# Patient Record
Sex: MALE | Race: WHITE | NOT HISPANIC OR LATINO | Employment: UNEMPLOYED | URBAN - METROPOLITAN AREA
[De-identification: names, ages, dates, MRNs, and addresses within clinical notes are randomized per-mention and may not be internally consistent; named-entity substitution may affect disease eponyms.]

---

## 2017-01-30 ENCOUNTER — OFFICE VISIT (OUTPATIENT)
Dept: CARDIOLOGY | Facility: CLINIC | Age: 57
End: 2017-01-30

## 2017-01-30 VITALS
BODY MASS INDEX: 19.43 KG/M2 | WEIGHT: 159.6 LBS | DIASTOLIC BLOOD PRESSURE: 78 MMHG | HEIGHT: 76 IN | HEART RATE: 64 BPM | SYSTOLIC BLOOD PRESSURE: 122 MMHG

## 2017-01-30 DIAGNOSIS — E78.00 PURE HYPERCHOLESTEROLEMIA: ICD-10-CM

## 2017-01-30 DIAGNOSIS — I63.9 BRAINSTEM STROKE (HCC): ICD-10-CM

## 2017-01-30 DIAGNOSIS — I10 ESSENTIAL HYPERTENSION: ICD-10-CM

## 2017-01-30 DIAGNOSIS — I25.10 CORONARY ARTERY DISEASE INVOLVING NATIVE CORONARY ARTERY OF NATIVE HEART WITHOUT ANGINA PECTORIS: ICD-10-CM

## 2017-01-30 DIAGNOSIS — R07.89 OTHER CHEST PAIN: Primary | ICD-10-CM

## 2017-01-30 DIAGNOSIS — Z72.0 TOBACCO ABUSE: ICD-10-CM

## 2017-01-30 PROCEDURE — 99204 OFFICE O/P NEW MOD 45 MIN: CPT | Performed by: INTERNAL MEDICINE

## 2017-01-30 PROCEDURE — 93000 ELECTROCARDIOGRAM COMPLETE: CPT | Performed by: INTERNAL MEDICINE

## 2017-01-30 RX ORDER — METOPROLOL SUCCINATE 25 MG/1
25 TABLET, EXTENDED RELEASE ORAL DAILY
COMMUNITY
End: 2018-04-09

## 2017-01-30 RX ORDER — ASPIRIN 81 MG/1
81 TABLET ORAL DAILY
COMMUNITY

## 2017-01-30 NOTE — PROGRESS NOTES
Subjective:     Encounter Date:01/30/2017      Patient ID: Kamron Johns is a 56 y.o. male.    Chief Complaint:Atrial Fibrillation (Consult); Chest Pain; Shortness of Breath; Pain (Rt lower arm all the way up to his Rt scapula); and Irregular Heart Beat    Past Medical History:  1. Coronary artery disease  1. Catheter 2012, [place], mild nonflow limiting disease        2.   Reported history of myocardial infarction by stress test.  2. Hypertension  3. Dyslipidemia  4. Reported history of atrial fibrillation  5. Ongoing tobacco abuse  6. Bell's palsy 2016  A. 2016 negative echo  B. 2016 mild bilateral carotid disease  C. MRI 2016 no acute intracranial abnormality.  7. Diverticulosis with history of diverticulitis  8. History nephrolithiasis  9. Cervical spine disease  10. Chronic idiopathic constipation  11. Anxiety   12. Depression      Past Surgical History:  1. Umbilical hernia repair  2. Finger surgery  3. Nasal septum repair      Allergies   Allergen Reactions   • Pseudoephedrine      Very hyper         Current Outpatient Prescriptions:   •  aspirin 81 MG EC tablet, Take 81 mg by mouth Daily., Disp: , Rfl:   •  atorvastatin (LIPITOR) 80 MG tablet, Take 1 tablet by mouth Every Night. (Patient taking differently: Take 10 mg by mouth 2 (Two) Times a Day.), Disp: 30 tablet, Rfl: 11  •  carisoprodol (SOMA) 250 MG tablet, Take 350 mg by mouth 3 (Three) Times a Day As Needed for muscle spasms., Disp: , Rfl:   •  clonazePAM (KlonoPIN) 1 MG tablet, Take 1 mg by mouth 3 (Three) Times a Day As Needed for seizures., Disp: , Rfl:   •  HYDROcodone-acetaminophen (LORTAB)  MG per tablet, Take 1 tablet by mouth 4 (Four) Times a Day As Needed for moderate pain (4-6)., Disp: , Rfl:   •  metoprolol succinate XL (TOPROL-XL) 25 MG 24 hr tablet, Take 25 mg by mouth Daily., Disp: , Rfl:   •  pantoprazole (PROTONIX) 40 MG EC tablet, Take 40 mg by mouth As Needed., Disp: , Rfl:   •  polyethylene glycol (MIRALAX) packet, Take 17 g  "by mouth Daily., Disp: , Rfl:   •  polyvinyl alcohol (LIQUIFILM) 1.4 % ophthalmic solution, Administer 1 drop into the left eye As Needed for dry eyes., Disp: , Rfl:   •  sodium chloride (OCEAN) 0.65 % nasal spray, 1 spray into each nostril As Needed for congestion., Disp: , Rfl:         History of Present Illness    Patient is a 56-year-old  male who we are seeing today for further evaluation of possible coronary disease and reported history of atrial fibrillation.    Patient report 2011 he was told that he had atrial fibrillation at time the had a cardiac catheterization with reported mild coronary disease.  He is in his usual state of health up until the end of October this past year whenever he had issues with left sided facial droop, speech difficulty, and dysphagia.  He was brought to the hospital for further evaluation.  There he was diagnosed with Bell's palsy and treated.  Since that time he still notes shortness of breath.  He has recurrent right arm pain which she describes as \"crushing\" in nature.  No specific triggering factors noted.  He notes he also underwent previous cardiac evaluation this past summer and at that time was told that he had had a heart attack in the past.  Patient does not carry a previous history of heart attack but no further investigation was performed.  No cardiac catheterization.  We are attempting to obtain records.  Patient has had chronic chest pain daily for years.  He is quite anxious and nervous with the fact he was told he had a \"heart attack\" by his stress test.  I'll confirm this finding.  He is convinced he had a heart attack on June 2 when he had an approximately one week's worth of severe \"crushing chest pain\", that was unrelenting.  He also notes persistent functional class 2-3 dyspnea which she attributes to a COPD.  He has a recent diagnosis of brainstem CVA, with a normal MRI.  He does have persistent neurologic findings which are obvious.  He needs to " "smoke more than 3 packs of cigarettes daily.  Even though he has a history of remote atrial fibrillation, none was documented on recent 14 day event recorder, or in his 1 week hospital stay.  The following portions of the patient's history were reviewed and updated as appropriate: allergies, current medications, past family history, past medical history, past social history, past surgical history and problem list.    Family History   Problem Relation Age of Onset   • Dementia Mother    • Emphysema Mother    • Alcohol abuse Father    • Cancer Father    • Heart disease Father    • Heart attack Father    • Cancer Brother    • Hypertension Brother    • Breast cancer Sister        Social History   Substance Use Topics   • Smoking status: Current Every Day Smoker     Packs/day: 3.00     Types: Cigarettes   • Smokeless tobacco: Never Used   • Alcohol use No         Review of Systems   Constitution: Positive for malaise/fatigue and weight loss. Negative for fever and weakness.   HENT: Negative for headaches and nosebleeds.    Eyes: Negative for redness and visual disturbance.   Cardiovascular: Positive for chest pain and palpitations. Negative for orthopnea and paroxysmal nocturnal dyspnea.   Respiratory: Positive for shortness of breath. Negative for cough, snoring, sputum production and wheezing.    Hematologic/Lymphatic: Negative for bleeding problem.   Skin: Negative for flushing, itching and rash.   Musculoskeletal: Positive for muscle weakness and myalgias. Negative for falls, joint pain and muscle cramps.   Gastrointestinal: Negative for abdominal pain, diarrhea, heartburn, nausea and vomiting.   Genitourinary: Negative for hematuria.   Neurological: Positive for dizziness. Negative for excessive daytime sleepiness and tremors.   Psychiatric/Behavioral: Negative for substance abuse. The patient is not nervous/anxious.           Objective:    height is 76\" (193 cm) and weight is 159 lb 9.6 oz (72.4 kg). His blood " pressure is 122/78 and his pulse is 64.         Physical Exam   Constitutional: He is oriented to person, place, and time. He appears well-developed and well-nourished.   HENT:   Head: Normocephalic and atraumatic.   Mouth/Throat: Oropharynx is clear and moist.   Eyes: Conjunctivae are normal. Pupils are equal, round, and reactive to light.   Neck: Normal carotid pulses and no JVD present. Carotid bruit is not present. No thyromegaly present.   Cardiovascular: Normal rate, regular rhythm, S1 normal and S2 normal.  Exam reveals no gallop and no friction rub.    No murmur heard.  Pulses:       Carotid pulses are 2+ on the right side, and 2+ on the left side.       Dorsalis pedis pulses are 2+ on the right side, and 2+ on the left side.        Posterior tibial pulses are 2+ on the right side, and 2+ on the left side.   Pulmonary/Chest: No respiratory distress. He has no wheezes. He has no rales. He exhibits no tenderness.   Abdominal: He exhibits no distension, no abdominal bruit and no mass. There is no hepatosplenomegaly. There is no tenderness. There is no rebound.   Musculoskeletal: He exhibits no edema, tenderness or deformity.   Lymphadenopathy:     He has no cervical adenopathy.   Neurological: He is alert and oriented to person, place, and time. He has normal strength.   Significant left facial droop noted.   Skin: Skin is warm and dry. No rash noted. No cyanosis. Nails show no clubbing.   Psychiatric: He has a normal mood and affect. Cognition and memory are normal.         ECG 12 Lead  Date/Time: 1/30/2017 5:31 PM  Performed by: KATERINA LEES  Authorized by: KATERINA LEES   Rhythm: sinus rhythm  Clinical impression: normal ECG                  Assessment:   Assessment/Plan      Kamron was seen today for atrial fibrillation, chest pain, shortness of breath, pain and irregular heart beat.    Diagnoses and all orders for this visit:    Other chest pain  -     Case Request Cath Lab: Left Heart  "Cath    Coronary artery disease involving native coronary artery of native heart without angina pectoris    Brainstem stroke    Pure hypercholesterolemia    Essential hypertension    Tobacco abuse    Other orders  -     ECG 12 Lead      1.  Coronary artery disease, mild by catheter 5 years ago  2.  Chest pain, some typical and atypical features.  Abnormal myocardial perfusion study done in [place] suggesting inferior infarction (never documented).  3.  Brainstem CVA, likely small vessel, no evidence of embolism.  4.  Questionable history of A. Fib, none documented.  Clinically, his brainstem CVA is most likely due to localized vascular disease, that embolic event.  5.  Tobacco abuse ongoing, 3-31/2 packs daily.  .6  Hypertension  7.  Dyslipidemia    Discussed at length options with his wife and patient.  Given his recurrent chronic chest pain abnormal perfusion study, he has a strong desire to \"no\" if he had a previous myocardial infarction or not.  We will therefore proceed to left heart catheterization plus or minus PCI given infrequency of his symptoms.  Regarding his A. Fib, there is no real evidence of this over multiple hospital stays in/monitoring periods.  His clinical event does not seem to be embolic in nature.  Will not pursue further workup at this time.  I had a long discussion regarding medical therapy, which is adequate, but more importantly smoking cessation.  We discussed that he has a difficulty quitting cold turkey from this degree of nicotine dependence.  I have asked him to try to cut back significantly,on his usage, in attempt to \"rarash in\" his cigarettes to a more reasonable level of chemical dependence (perhaps one to one and half packs daily) for attempting to quit.further recommendations pending the heart catheter     I, Horacio Bedoya MD, personally performed the services described in this documentation as scribed by the above individual in my presence, and it is both accurate and " complete    Please note that portions of this note may have been completed with a voice recognition program. Efforts were made to edit the dictations, but occasionally words are mistranscribed.

## 2017-01-30 NOTE — LETTER
January 30, 2017     Rebeca Vallejo PA-C  [address]  [address]  [facility] [address]    Patient: Kamron Johns   YOB: 1960   Date of Visit: 1/30/2017       Dear Dr. Yoni PA-C:    Thank you for referring Kamron Johns to me for evaluation. Below are the relevant portions of my assessment and plan of care.    If you have questions, please do not hesitate to call me. I look forward to following Kamron along with you.         Sincerely,        Horacio Bedoya MD        CC: MD Horacio Estrada MD  1/30/2017  5:35 PM  Signed  Subjective:     Encounter Date:01/30/2017      Patient ID: Kamron Johns is a 56 y.o. male.    Chief Complaint:Atrial Fibrillation (Consult); Chest Pain; Shortness of Breath; Pain (Rt lower arm all the way up to his Rt scapula); and Irregular Heart Beat    Past Medical History:  1. Coronary artery disease  1. Catheter 2012, [place], mild nonflow limiting disease        2.   Reported history of myocardial infarction by stress test.  2. Hypertension  3. Dyslipidemia  4. Reported history of atrial fibrillation  5. Ongoing tobacco abuse  6. Bell's palsy 2016  A. 2016 negative echo  B. 2016 mild bilateral carotid disease  C. MRI 2016 no acute intracranial abnormality.  7. Diverticulosis with history of diverticulitis  8. History nephrolithiasis  9. Cervical spine disease  10. Chronic idiopathic constipation  11. Anxiety   12. Depression      Past Surgical History:  1. Umbilical hernia repair  2. Finger surgery  3. Nasal septum repair      Allergies   Allergen Reactions   • Pseudoephedrine      Very hyper         Current Outpatient Prescriptions:   •  aspirin 81 MG EC tablet, Take 81 mg by mouth Daily., Disp: , Rfl:   •  atorvastatin (LIPITOR) 80 MG tablet, Take 1 tablet by mouth Every Night. (Patient taking differently: Take 10 mg by mouth 2 (Two) Times a Day.), Disp: 30 tablet, Rfl: 11  •  carisoprodol (SOMA) 250 MG tablet, Take 350 mg by mouth 3 (Three)  "Times a Day As Needed for muscle spasms., Disp: , Rfl:   •  clonazePAM (KlonoPIN) 1 MG tablet, Take 1 mg by mouth 3 (Three) Times a Day As Needed for seizures., Disp: , Rfl:   •  HYDROcodone-acetaminophen (LORTAB)  MG per tablet, Take 1 tablet by mouth 4 (Four) Times a Day As Needed for moderate pain (4-6)., Disp: , Rfl:   •  metoprolol succinate XL (TOPROL-XL) 25 MG 24 hr tablet, Take 25 mg by mouth Daily., Disp: , Rfl:   •  pantoprazole (PROTONIX) 40 MG EC tablet, Take 40 mg by mouth As Needed., Disp: , Rfl:   •  polyethylene glycol (MIRALAX) packet, Take 17 g by mouth Daily., Disp: , Rfl:   •  polyvinyl alcohol (LIQUIFILM) 1.4 % ophthalmic solution, Administer 1 drop into the left eye As Needed for dry eyes., Disp: , Rfl:   •  sodium chloride (OCEAN) 0.65 % nasal spray, 1 spray into each nostril As Needed for congestion., Disp: , Rfl:         History of Present Illness    Patient is a 56-year-old  male who we are seeing today for further evaluation of possible coronary disease and reported history of atrial fibrillation.    Patient report 2011 he was told that he had atrial fibrillation at time the had a cardiac catheterization with reported mild coronary disease.  He is in his usual state of health up until the end of October this past year whenever he had issues with left sided facial droop, speech difficulty, and dysphagia.  He was brought to the hospital for further evaluation.  There he was diagnosed with Bell's palsy and treated.  Since that time he still notes shortness of breath.  He has recurrent right arm pain which she describes as \"crushing\" in nature.  No specific triggering factors noted.  He notes he also underwent previous cardiac evaluation this past summer and at that time was told that he had had a heart attack in the past.  Patient does not carry a previous history of heart attack but no further investigation was performed.  No cardiac catheterization.  We are attempting to " "obtain records.  Patient has had chronic chest pain daily for years.  He is quite anxious and nervous with the fact he was told he had a \"heart attack\" by his stress test.  I'll confirm this finding.  He is convinced he had a heart attack on June 2 when he had an approximately one week's worth of severe \"crushing chest pain\", that was unrelenting.  He also notes persistent functional class 2-3 dyspnea which she attributes to a COPD.  He has a recent diagnosis of brainstem CVA, with a normal MRI.  He does have persistent neurologic findings which are obvious.  He needs to smoke more than 3 packs of cigarettes daily.  Even though he has a history of remote atrial fibrillation, none was documented on recent 14 day event recorder, or in his 1 week hospital stay.  The following portions of the patient's history were reviewed and updated as appropriate: allergies, current medications, past family history, past medical history, past social history, past surgical history and problem list.    Family History   Problem Relation Age of Onset   • Dementia Mother    • Emphysema Mother    • Alcohol abuse Father    • Cancer Father    • Heart disease Father    • Heart attack Father    • Cancer Brother    • Hypertension Brother    • Breast cancer Sister        Social History   Substance Use Topics   • Smoking status: Current Every Day Smoker     Packs/day: 3.00     Types: Cigarettes   • Smokeless tobacco: Never Used   • Alcohol use No         Review of Systems   Constitution: Positive for malaise/fatigue and weight loss. Negative for fever and weakness.   HENT: Negative for headaches and nosebleeds.    Eyes: Negative for redness and visual disturbance.   Cardiovascular: Positive for chest pain and palpitations. Negative for orthopnea and paroxysmal nocturnal dyspnea.   Respiratory: Positive for shortness of breath. Negative for cough, snoring, sputum production and wheezing.    Hematologic/Lymphatic: Negative for bleeding problem. " "  Skin: Negative for flushing, itching and rash.   Musculoskeletal: Positive for muscle weakness and myalgias. Negative for falls, joint pain and muscle cramps.   Gastrointestinal: Negative for abdominal pain, diarrhea, heartburn, nausea and vomiting.   Genitourinary: Negative for hematuria.   Neurological: Positive for dizziness. Negative for excessive daytime sleepiness and tremors.   Psychiatric/Behavioral: Negative for substance abuse. The patient is not nervous/anxious.           Objective:    height is 76\" (193 cm) and weight is 159 lb 9.6 oz (72.4 kg). His blood pressure is 122/78 and his pulse is 64.         Physical Exam   Constitutional: He is oriented to person, place, and time. He appears well-developed and well-nourished.   HENT:   Head: Normocephalic and atraumatic.   Mouth/Throat: Oropharynx is clear and moist.   Eyes: Conjunctivae are normal. Pupils are equal, round, and reactive to light.   Neck: Normal carotid pulses and no JVD present. Carotid bruit is not present. No thyromegaly present.   Cardiovascular: Normal rate, regular rhythm, S1 normal and S2 normal.  Exam reveals no gallop and no friction rub.    No murmur heard.  Pulses:       Carotid pulses are 2+ on the right side, and 2+ on the left side.       Dorsalis pedis pulses are 2+ on the right side, and 2+ on the left side.        Posterior tibial pulses are 2+ on the right side, and 2+ on the left side.   Pulmonary/Chest: No respiratory distress. He has no wheezes. He has no rales. He exhibits no tenderness.   Abdominal: He exhibits no distension, no abdominal bruit and no mass. There is no hepatosplenomegaly. There is no tenderness. There is no rebound.   Musculoskeletal: He exhibits no edema, tenderness or deformity.   Lymphadenopathy:     He has no cervical adenopathy.   Neurological: He is alert and oriented to person, place, and time. He has normal strength.   Significant left facial droop noted.   Skin: Skin is warm and dry. No rash " "noted. No cyanosis. Nails show no clubbing.   Psychiatric: He has a normal mood and affect. Cognition and memory are normal.         ECG 12 Lead  Date/Time: 1/30/2017 5:31 PM  Performed by: KATERINA LEES  Authorized by: KATERINA LEES   Rhythm: sinus rhythm  Clinical impression: normal ECG                  Assessment:   Assessment/Plan      Kamron was seen today for atrial fibrillation, chest pain, shortness of breath, pain and irregular heart beat.    Diagnoses and all orders for this visit:    Other chest pain  -     Case Request Cath Lab: Left Heart Cath    Coronary artery disease involving native coronary artery of native heart without angina pectoris    Brainstem stroke    Pure hypercholesterolemia    Essential hypertension    Tobacco abuse    Other orders  -     ECG 12 Lead      1.  Coronary artery disease, mild by catheter 5 years ago  2.  Chest pain, some typical and atypical features.  Abnormal myocardial perfusion study done in [place] suggesting inferior infarction (never documented).  3.  Brainstem CVA, likely small vessel, no evidence of embolism.  4.  Questionable history of A. Fib, none documented.  Clinically, his brainstem CVA is most likely due to localized vascular disease, that embolic event.  5.  Tobacco abuse ongoing, 3-31/2 packs daily.  .6  Hypertension  7.  Dyslipidemia    Discussed at length options with his wife and patient.  Given his recurrent chronic chest pain abnormal perfusion study, he has a strong desire to \"no\" if he had a previous myocardial infarction or not.  We will therefore proceed to left heart catheterization plus or minus PCI given infrequency of his symptoms.  Regarding his A. Fib, there is no real evidence of this over multiple hospital stays in/monitoring periods.  His clinical event does not seem to be embolic in nature.  Will not pursue further workup at this time.  I had a long discussion regarding medical therapy, which is adequate, but more importantly smoking " "cessation.  We discussed that he has a difficulty quitting cold turkey from this degree of nicotine dependence.  I have asked him to try to cut back significantly,on his usage, in attempt to \"rarash in\" his cigarettes to a more reasonable level of chemical dependence (perhaps one to one and half packs daily) for attempting to quit.further recommendations pending the heart catheter     I, Horacio Bedoya MD, personally performed the services described in this documentation as scribed by the above individual in my presence, and it is both accurate and complete    Please note that portions of this note may have been completed with a voice recognition program. Efforts were made to edit the dictations, but occasionally words are mistranscribed.  "

## 2017-02-06 DIAGNOSIS — I20.9 ANGINA PECTORIS (HCC): Primary | ICD-10-CM

## 2017-02-06 RX ORDER — ASPIRIN 81 MG/1
325 TABLET ORAL DAILY
Status: CANCELLED | OUTPATIENT
Start: 2017-02-07

## 2017-02-06 RX ORDER — NITROGLYCERIN 0.4 MG/1
0.4 TABLET SUBLINGUAL
Status: CANCELLED | OUTPATIENT
Start: 2017-02-06

## 2017-02-06 RX ORDER — ASPIRIN 325 MG
325 TABLET ORAL ONCE
Status: CANCELLED | OUTPATIENT
Start: 2017-02-06 | End: 2017-02-06

## 2017-02-06 RX ORDER — ACETAMINOPHEN 325 MG/1
650 TABLET ORAL EVERY 4 HOURS PRN
Status: CANCELLED | OUTPATIENT
Start: 2017-02-06

## 2017-02-06 RX ORDER — SODIUM CHLORIDE 0.9 % (FLUSH) 0.9 %
1-10 SYRINGE (ML) INJECTION AS NEEDED
Status: CANCELLED | OUTPATIENT
Start: 2017-02-06

## 2017-02-06 RX ORDER — ONDANSETRON 2 MG/ML
4 INJECTION INTRAMUSCULAR; INTRAVENOUS EVERY 6 HOURS PRN
Status: CANCELLED | OUTPATIENT
Start: 2017-02-06

## 2017-02-17 ENCOUNTER — HOSPITAL ENCOUNTER (OUTPATIENT)
Facility: HOSPITAL | Age: 57
Setting detail: HOSPITAL OUTPATIENT SURGERY
Discharge: HOME | End: 2017-02-17
Attending: INTERNAL MEDICINE | Admitting: INTERNAL MEDICINE

## 2017-02-17 ENCOUNTER — APPOINTMENT (OUTPATIENT)
Dept: GENERAL RADIOLOGY | Facility: HOSPITAL | Age: 57
End: 2017-02-17

## 2017-02-17 VITALS
TEMPERATURE: 97.7 F | RESPIRATION RATE: 18 BRPM | DIASTOLIC BLOOD PRESSURE: 78 MMHG | WEIGHT: 155.65 LBS | SYSTOLIC BLOOD PRESSURE: 134 MMHG | BODY MASS INDEX: 19.35 KG/M2 | HEART RATE: 60 BPM | HEIGHT: 75 IN | OXYGEN SATURATION: 100 %

## 2017-02-17 DIAGNOSIS — I20.9 ANGINA PECTORIS (HCC): ICD-10-CM

## 2017-02-17 DIAGNOSIS — R07.89 OTHER CHEST PAIN: ICD-10-CM

## 2017-02-17 LAB
ALBUMIN SERPL-MCNC: 4.2 G/DL (ref 3.2–4.8)
ALBUMIN/GLOB SERPL: 1.5 G/DL (ref 1.5–2.5)
ALP SERPL-CCNC: 69 U/L (ref 25–100)
ALT SERPL W P-5'-P-CCNC: 13 U/L (ref 7–40)
ANION GAP SERPL CALCULATED.3IONS-SCNC: 1 MMOL/L (ref 3–11)
ARTICHOKE IGE QN: 85 MG/DL (ref 0–130)
AST SERPL-CCNC: 15 U/L (ref 0–33)
BILIRUB SERPL-MCNC: 0.2 MG/DL (ref 0.3–1.2)
BUN BLD-MCNC: 10 MG/DL (ref 9–23)
BUN/CREAT SERPL: 16.7 (ref 7–25)
CALCIUM SPEC-SCNC: 9.6 MG/DL (ref 8.7–10.4)
CHLORIDE SERPL-SCNC: 104 MMOL/L (ref 99–109)
CHOLEST SERPL-MCNC: 135 MG/DL (ref 0–200)
CO2 SERPL-SCNC: 33 MMOL/L (ref 20–31)
CREAT BLD-MCNC: 0.6 MG/DL (ref 0.6–1.3)
DEPRECATED RDW RBC AUTO: 45.1 FL (ref 37–54)
ERYTHROCYTE [DISTWIDTH] IN BLOOD BY AUTOMATED COUNT: 12.7 % (ref 11.3–14.5)
GFR SERPL CREATININE-BSD FRML MDRD: 140 ML/MIN/1.73
GLOBULIN UR ELPH-MCNC: 2.8 GM/DL
GLUCOSE BLD-MCNC: 95 MG/DL (ref 70–100)
HBA1C MFR BLD: 5.5 % (ref 4.8–5.6)
HCT VFR BLD AUTO: 43.3 % (ref 38.9–50.9)
HDLC SERPL-MCNC: 33 MG/DL (ref 40–60)
HGB BLD-MCNC: 14.6 G/DL (ref 13.1–17.5)
MCH RBC QN AUTO: 32.9 PG (ref 27–31)
MCHC RBC AUTO-ENTMCNC: 33.7 G/DL (ref 32–36)
MCV RBC AUTO: 97.5 FL (ref 80–99)
PLATELET # BLD AUTO: 322 10*3/MM3 (ref 150–450)
PMV BLD AUTO: 9.8 FL (ref 6–12)
POTASSIUM BLD-SCNC: 3.6 MMOL/L (ref 3.5–5.5)
PROT SERPL-MCNC: 7 G/DL (ref 5.7–8.2)
RBC # BLD AUTO: 4.44 10*6/MM3 (ref 4.2–5.76)
SODIUM BLD-SCNC: 138 MMOL/L (ref 132–146)
TRIGL SERPL-MCNC: 156 MG/DL (ref 0–150)
WBC NRBC COR # BLD: 6.3 10*3/MM3 (ref 3.5–10.8)

## 2017-02-17 PROCEDURE — 25010000002 BIVALIRUDIN PER 1 MG: Performed by: INTERNAL MEDICINE

## 2017-02-17 PROCEDURE — 93458 L HRT ARTERY/VENTRICLE ANGIO: CPT | Performed by: INTERNAL MEDICINE

## 2017-02-17 PROCEDURE — C1725 CATH, TRANSLUMIN NON-LASER: HCPCS | Performed by: INTERNAL MEDICINE

## 2017-02-17 PROCEDURE — 25010000002 FENTANYL CITRATE (PF) 100 MCG/2ML SOLUTION: Performed by: INTERNAL MEDICINE

## 2017-02-17 PROCEDURE — 93571 IV DOP VEL&/PRESS C FLO 1ST: CPT | Performed by: INTERNAL MEDICINE

## 2017-02-17 PROCEDURE — 80053 COMPREHEN METABOLIC PANEL: CPT | Performed by: NURSE PRACTITIONER

## 2017-02-17 PROCEDURE — C1769 GUIDE WIRE: HCPCS | Performed by: INTERNAL MEDICINE

## 2017-02-17 PROCEDURE — 92928 PRQ TCAT PLMT NTRAC ST 1 LES: CPT | Performed by: INTERNAL MEDICINE

## 2017-02-17 PROCEDURE — C1894 INTRO/SHEATH, NON-LASER: HCPCS | Performed by: INTERNAL MEDICINE

## 2017-02-17 PROCEDURE — 25010000002 HEPARIN (PORCINE) PER 1000 UNITS: Performed by: INTERNAL MEDICINE

## 2017-02-17 PROCEDURE — C9600 PERC DRUG-EL COR STENT SING: HCPCS | Performed by: INTERNAL MEDICINE

## 2017-02-17 PROCEDURE — 25010000002 MIDAZOLAM PER 1 MG: Performed by: INTERNAL MEDICINE

## 2017-02-17 PROCEDURE — 80061 LIPID PANEL: CPT | Performed by: NURSE PRACTITIONER

## 2017-02-17 PROCEDURE — 0 IOPAMIDOL PER 1 ML: Performed by: INTERNAL MEDICINE

## 2017-02-17 PROCEDURE — 85027 COMPLETE CBC AUTOMATED: CPT | Performed by: NURSE PRACTITIONER

## 2017-02-17 PROCEDURE — 83036 HEMOGLOBIN GLYCOSYLATED A1C: CPT | Performed by: NURSE PRACTITIONER

## 2017-02-17 PROCEDURE — 36415 COLL VENOUS BLD VENIPUNCTURE: CPT

## 2017-02-17 PROCEDURE — 71010 HC CHEST PA OR AP: CPT

## 2017-02-17 PROCEDURE — C1887 CATHETER, GUIDING: HCPCS | Performed by: INTERNAL MEDICINE

## 2017-02-17 PROCEDURE — C1874 STENT, COATED/COV W/DEL SYS: HCPCS | Performed by: INTERNAL MEDICINE

## 2017-02-17 DEVICE — XIENCE ALPINE EVEROLIMUS ELUTING CORONARY STENT SYSTEM 3.00 MM X 15 MM / RAPID-EXCHANGE
Type: IMPLANTABLE DEVICE | Status: FUNCTIONAL
Brand: XIENCE ALPINE

## 2017-02-17 RX ORDER — ONDANSETRON 2 MG/ML
4 INJECTION INTRAMUSCULAR; INTRAVENOUS EVERY 6 HOURS PRN
Status: DISCONTINUED | OUTPATIENT
Start: 2017-02-17 | End: 2017-02-17 | Stop reason: HOSPADM

## 2017-02-17 RX ORDER — SODIUM CHLORIDE 0.9 % (FLUSH) 0.9 %
1-10 SYRINGE (ML) INJECTION AS NEEDED
Status: DISCONTINUED | OUTPATIENT
Start: 2017-02-17 | End: 2017-02-17 | Stop reason: HOSPADM

## 2017-02-17 RX ORDER — ACETAMINOPHEN 325 MG/1
650 TABLET ORAL EVERY 4 HOURS PRN
Status: DISCONTINUED | OUTPATIENT
Start: 2017-02-17 | End: 2017-02-17 | Stop reason: HOSPADM

## 2017-02-17 RX ORDER — ASPIRIN 325 MG
325 TABLET ORAL ONCE
Status: COMPLETED | OUTPATIENT
Start: 2017-02-17 | End: 2017-02-17

## 2017-02-17 RX ORDER — FENTANYL CITRATE 50 UG/ML
INJECTION, SOLUTION INTRAMUSCULAR; INTRAVENOUS AS NEEDED
Status: DISCONTINUED | OUTPATIENT
Start: 2017-02-17 | End: 2017-02-17 | Stop reason: HOSPADM

## 2017-02-17 RX ORDER — MIDAZOLAM HYDROCHLORIDE 1 MG/ML
INJECTION INTRAMUSCULAR; INTRAVENOUS AS NEEDED
Status: DISCONTINUED | OUTPATIENT
Start: 2017-02-17 | End: 2017-02-17 | Stop reason: HOSPADM

## 2017-02-17 RX ORDER — CLOPIDOGREL BISULFATE 75 MG/1
75 TABLET ORAL DAILY
Qty: 30 TABLET | Refills: 11 | Status: SHIPPED | OUTPATIENT
Start: 2017-02-17 | End: 2018-03-27 | Stop reason: SDUPTHER

## 2017-02-17 RX ORDER — NITROGLYCERIN 0.4 MG/1
0.4 TABLET SUBLINGUAL
Status: DISCONTINUED | OUTPATIENT
Start: 2017-02-17 | End: 2017-02-17 | Stop reason: HOSPADM

## 2017-02-17 RX ORDER — HYDROCODONE BITARTRATE AND ACETAMINOPHEN 5; 325 MG/1; MG/1
1 TABLET ORAL EVERY 4 HOURS PRN
Status: DISCONTINUED | OUTPATIENT
Start: 2017-02-17 | End: 2017-02-17 | Stop reason: HOSPADM

## 2017-02-17 RX ORDER — ASPIRIN 325 MG
325 TABLET, DELAYED RELEASE (ENTERIC COATED) ORAL DAILY
Status: DISCONTINUED | OUTPATIENT
Start: 2017-02-18 | End: 2017-02-17 | Stop reason: HOSPADM

## 2017-02-17 RX ORDER — CLOPIDOGREL BISULFATE 75 MG/1
TABLET ORAL AS NEEDED
Status: DISCONTINUED | OUTPATIENT
Start: 2017-02-17 | End: 2017-02-17 | Stop reason: HOSPADM

## 2017-02-17 RX ORDER — ASPIRIN 81 MG/1
81 TABLET, CHEWABLE ORAL DAILY
Status: DISCONTINUED | OUTPATIENT
Start: 2017-02-18 | End: 2017-02-17 | Stop reason: HOSPADM

## 2017-02-17 RX ORDER — SODIUM CHLORIDE 9 MG/ML
1-3 INJECTION, SOLUTION INTRAVENOUS CONTINUOUS
Status: DISCONTINUED | OUTPATIENT
Start: 2017-02-17 | End: 2017-02-17 | Stop reason: HOSPADM

## 2017-02-17 RX ORDER — NITROGLYCERIN 5 MG/ML
INJECTION, SOLUTION INTRAVENOUS AS NEEDED
Status: DISCONTINUED | OUTPATIENT
Start: 2017-02-17 | End: 2017-02-17 | Stop reason: HOSPADM

## 2017-02-17 RX ORDER — LIDOCAINE HYDROCHLORIDE 10 MG/ML
INJECTION, SOLUTION INFILTRATION; PERINEURAL AS NEEDED
Status: DISCONTINUED | OUTPATIENT
Start: 2017-02-17 | End: 2017-02-17 | Stop reason: HOSPADM

## 2017-02-17 RX ORDER — ATORVASTATIN CALCIUM 10 MG/1
10 TABLET, FILM COATED ORAL NIGHTLY
Status: ON HOLD | COMMUNITY
End: 2017-02-17

## 2017-02-17 RX ORDER — ATORVASTATIN CALCIUM 10 MG/1
20 TABLET, FILM COATED ORAL NIGHTLY
Qty: 30 TABLET | Refills: 11 | Status: SHIPPED | OUTPATIENT
Start: 2017-02-17 | End: 2017-03-02

## 2017-02-17 RX ADMIN — ASPIRIN 325 MG ORAL TABLET 325 MG: 325 PILL ORAL at 08:13

## 2017-02-17 RX ADMIN — SODIUM CHLORIDE 3 ML/KG/HR: 9 INJECTION, SOLUTION INTRAVENOUS at 08:13

## 2017-02-17 NOTE — H&P (VIEW-ONLY)
Subjective:     Encounter Date:01/30/2017      Patient ID: Kamron Johns is a 56 y.o. male.    Chief Complaint:Atrial Fibrillation (Consult); Chest Pain; Shortness of Breath; Pain (Rt lower arm all the way up to his Rt scapula); and Irregular Heart Beat    Past Medical History:  1. Coronary artery disease  1. Catheter 2012, Sanchez, mild nonflow limiting disease        2.   Reported history of myocardial infarction by stress test.  2. Hypertension  3. Dyslipidemia  4. Reported history of atrial fibrillation  5. Ongoing tobacco abuse  6. Bell's palsy 2016  A. 2016 negative echo  B. 2016 mild bilateral carotid disease  C. MRI 2016 no acute intracranial abnormality.  7. Diverticulosis with history of diverticulitis  8. History nephrolithiasis  9. Cervical spine disease  10. Chronic idiopathic constipation  11. Anxiety   12. Depression      Past Surgical History:  1. Umbilical hernia repair  2. Finger surgery  3. Nasal septum repair      Allergies   Allergen Reactions   • Pseudoephedrine      Very hyper         Current Outpatient Prescriptions:   •  aspirin 81 MG EC tablet, Take 81 mg by mouth Daily., Disp: , Rfl:   •  atorvastatin (LIPITOR) 80 MG tablet, Take 1 tablet by mouth Every Night. (Patient taking differently: Take 10 mg by mouth 2 (Two) Times a Day.), Disp: 30 tablet, Rfl: 11  •  carisoprodol (SOMA) 250 MG tablet, Take 350 mg by mouth 3 (Three) Times a Day As Needed for muscle spasms., Disp: , Rfl:   •  clonazePAM (KlonoPIN) 1 MG tablet, Take 1 mg by mouth 3 (Three) Times a Day As Needed for seizures., Disp: , Rfl:   •  HYDROcodone-acetaminophen (LORTAB)  MG per tablet, Take 1 tablet by mouth 4 (Four) Times a Day As Needed for moderate pain (4-6)., Disp: , Rfl:   •  metoprolol succinate XL (TOPROL-XL) 25 MG 24 hr tablet, Take 25 mg by mouth Daily., Disp: , Rfl:   •  pantoprazole (PROTONIX) 40 MG EC tablet, Take 40 mg by mouth As Needed., Disp: , Rfl:   •  polyethylene glycol (MIRALAX) packet, Take 17 g  "by mouth Daily., Disp: , Rfl:   •  polyvinyl alcohol (LIQUIFILM) 1.4 % ophthalmic solution, Administer 1 drop into the left eye As Needed for dry eyes., Disp: , Rfl:   •  sodium chloride (OCEAN) 0.65 % nasal spray, 1 spray into each nostril As Needed for congestion., Disp: , Rfl:         History of Present Illness    Patient is a 56-year-old  male who we are seeing today for further evaluation of possible coronary disease and reported history of atrial fibrillation.    Patient report 2011 he was told that he had atrial fibrillation at time the had a cardiac catheterization with reported mild coronary disease.  He is in his usual state of health up until the end of October this past year whenever he had issues with left sided facial droop, speech difficulty, and dysphagia.  He was brought to the hospital for further evaluation.  There he was diagnosed with Bell's palsy and treated.  Since that time he still notes shortness of breath.  He has recurrent right arm pain which she describes as \"crushing\" in nature.  No specific triggering factors noted.  He notes he also underwent previous cardiac evaluation this past summer and at that time was told that he had had a heart attack in the past.  Patient does not carry a previous history of heart attack but no further investigation was performed.  No cardiac catheterization.  We are attempting to obtain records.  Patient has had chronic chest pain daily for years.  He is quite anxious and nervous with the fact he was told he had a \"heart attack\" by his stress test.  I'll confirm this finding.  He is convinced he had a heart attack on June 2 when he had an approximately one week's worth of severe \"crushing chest pain\", that was unrelenting.  He also notes persistent functional class 2-3 dyspnea which she attributes to a COPD.  He has a recent diagnosis of brainstem CVA, with a normal MRI.  He does have persistent neurologic findings which are obvious.  He needs to " "smoke more than 3 packs of cigarettes daily.  Even though he has a history of remote atrial fibrillation, none was documented on recent 14 day event recorder, or in his 1 week hospital stay.  The following portions of the patient's history were reviewed and updated as appropriate: allergies, current medications, past family history, past medical history, past social history, past surgical history and problem list.    Family History   Problem Relation Age of Onset   • Dementia Mother    • Emphysema Mother    • Alcohol abuse Father    • Cancer Father    • Heart disease Father    • Heart attack Father    • Cancer Brother    • Hypertension Brother    • Breast cancer Sister        Social History   Substance Use Topics   • Smoking status: Current Every Day Smoker     Packs/day: 3.00     Types: Cigarettes   • Smokeless tobacco: Never Used   • Alcohol use No         Review of Systems   Constitution: Positive for malaise/fatigue and weight loss. Negative for fever and weakness.   HENT: Negative for headaches and nosebleeds.    Eyes: Negative for redness and visual disturbance.   Cardiovascular: Positive for chest pain and palpitations. Negative for orthopnea and paroxysmal nocturnal dyspnea.   Respiratory: Positive for shortness of breath. Negative for cough, snoring, sputum production and wheezing.    Hematologic/Lymphatic: Negative for bleeding problem.   Skin: Negative for flushing, itching and rash.   Musculoskeletal: Positive for muscle weakness and myalgias. Negative for falls, joint pain and muscle cramps.   Gastrointestinal: Negative for abdominal pain, diarrhea, heartburn, nausea and vomiting.   Genitourinary: Negative for hematuria.   Neurological: Positive for dizziness. Negative for excessive daytime sleepiness and tremors.   Psychiatric/Behavioral: Negative for substance abuse. The patient is not nervous/anxious.           Objective:    height is 76\" (193 cm) and weight is 159 lb 9.6 oz (72.4 kg). His blood " pressure is 122/78 and his pulse is 64.         Physical Exam   Constitutional: He is oriented to person, place, and time. He appears well-developed and well-nourished.   HENT:   Head: Normocephalic and atraumatic.   Mouth/Throat: Oropharynx is clear and moist.   Eyes: Conjunctivae are normal. Pupils are equal, round, and reactive to light.   Neck: Normal carotid pulses and no JVD present. Carotid bruit is not present. No thyromegaly present.   Cardiovascular: Normal rate, regular rhythm, S1 normal and S2 normal.  Exam reveals no gallop and no friction rub.    No murmur heard.  Pulses:       Carotid pulses are 2+ on the right side, and 2+ on the left side.       Dorsalis pedis pulses are 2+ on the right side, and 2+ on the left side.        Posterior tibial pulses are 2+ on the right side, and 2+ on the left side.   Pulmonary/Chest: No respiratory distress. He has no wheezes. He has no rales. He exhibits no tenderness.   Abdominal: He exhibits no distension, no abdominal bruit and no mass. There is no hepatosplenomegaly. There is no tenderness. There is no rebound.   Musculoskeletal: He exhibits no edema, tenderness or deformity.   Lymphadenopathy:     He has no cervical adenopathy.   Neurological: He is alert and oriented to person, place, and time. He has normal strength.   Significant left facial droop noted.   Skin: Skin is warm and dry. No rash noted. No cyanosis. Nails show no clubbing.   Psychiatric: He has a normal mood and affect. Cognition and memory are normal.         ECG 12 Lead  Date/Time: 1/30/2017 5:31 PM  Performed by: KATERINA LEES  Authorized by: KATERINA LEES   Rhythm: sinus rhythm  Clinical impression: normal ECG                  Assessment:   Assessment/Plan      Kamron was seen today for atrial fibrillation, chest pain, shortness of breath, pain and irregular heart beat.    Diagnoses and all orders for this visit:    Other chest pain  -     Case Request Cath Lab: Left Heart  "Cath    Coronary artery disease involving native coronary artery of native heart without angina pectoris    Brainstem stroke    Pure hypercholesterolemia    Essential hypertension    Tobacco abuse    Other orders  -     ECG 12 Lead      1.  Coronary artery disease, mild by catheter 5 years ago  2.  Chest pain, some typical and atypical features.  Abnormal myocardial perfusion study done in [place] suggesting inferior infarction (never documented).  3.  Brainstem CVA, likely small vessel, no evidence of embolism.  4.  Questionable history of A. Fib, none documented.  Clinically, his brainstem CVA is most likely due to localized vascular disease, that embolic event.  5.  Tobacco abuse ongoing, 3-31/2 packs daily.  .6  Hypertension  7.  Dyslipidemia    Discussed at length options with his wife and patient.  Given his recurrent chronic chest pain abnormal perfusion study, he has a strong desire to \"no\" if he had a previous myocardial infarction or not.  We will therefore proceed to left heart catheterization plus or minus PCI given infrequency of his symptoms.  Regarding his A. Fib, there is no real evidence of this over multiple hospital stays in/monitoring periods.  His clinical event does not seem to be embolic in nature.  Will not pursue further workup at this time.  I had a long discussion regarding medical therapy, which is adequate, but more importantly smoking cessation.  We discussed that he has a difficulty quitting cold turkey from this degree of nicotine dependence.  I have asked him to try to cut back significantly,on his usage, in attempt to \"rarash in\" his cigarettes to a more reasonable level of chemical dependence (perhaps one to one and half packs daily) for attempting to quit.further recommendations pending the heart catheter     I, Horacio Bedoya MD, personally performed the services described in this documentation as scribed by the above individual in my presence, and it is both accurate and " complete    Please note that portions of this note may have been completed with a voice recognition program. Efforts were made to edit the dictations, but occasionally words are mistranscribed.

## 2017-02-17 NOTE — PLAN OF CARE
Problem: Patient Care Overview (Adult)  Goal: Plan of Care Review  Outcome: Ongoing (interventions implemented as appropriate)    02/17/17 0721   Coping/Psychosocial Response Interventions   Plan Of Care Reviewed With patient   Patient Care Overview   Progress progress toward functional goals as expected

## 2017-02-17 NOTE — INTERVAL H&P NOTE
H&P reviewed. The patient was examined and there are no changes to the H&P. Left becky's test positive.    FRANCISCO Peace  02/17/17  8:36 AM      I, Horacio Bedoya have reviewed the note in full and agree with all aspects of the above including physical exam, assessment, labs and plan with changes made accordingly.     Horacio Bedoya MD  02/17/17  11:04 AM

## 2017-02-23 ENCOUNTER — TELEPHONE (OUTPATIENT)
Dept: CARDIOLOGY | Facility: CLINIC | Age: 57
End: 2017-02-23

## 2017-02-23 NOTE — TELEPHONE ENCOUNTER
Patient left VM regarding a knot on his forearm s/p [facility] [place] last week. Patient did not answer upon me calling him back so I left him a VM to return our call.

## 2017-03-02 RX ORDER — ATORVASTATIN CALCIUM 20 MG/1
20 TABLET, FILM COATED ORAL DAILY
Qty: 90 TABLET | Refills: 3 | Status: SHIPPED | OUTPATIENT
Start: 2017-03-02 | End: 2018-04-09

## 2017-03-27 ENCOUNTER — OFFICE VISIT (OUTPATIENT)
Dept: CARDIOLOGY | Facility: CLINIC | Age: 57
End: 2017-03-27

## 2017-03-27 VITALS
DIASTOLIC BLOOD PRESSURE: 90 MMHG | SYSTOLIC BLOOD PRESSURE: 130 MMHG | BODY MASS INDEX: 19.41 KG/M2 | HEIGHT: 76 IN | HEART RATE: 67 BPM | WEIGHT: 159.4 LBS

## 2017-03-27 DIAGNOSIS — I25.10 CORONARY ARTERY DISEASE INVOLVING NATIVE CORONARY ARTERY OF NATIVE HEART WITHOUT ANGINA PECTORIS: Primary | ICD-10-CM

## 2017-03-27 DIAGNOSIS — E78.00 PURE HYPERCHOLESTEROLEMIA: ICD-10-CM

## 2017-03-27 DIAGNOSIS — I10 ESSENTIAL HYPERTENSION: ICD-10-CM

## 2017-03-27 PROCEDURE — 99213 OFFICE O/P EST LOW 20 MIN: CPT | Performed by: NURSE PRACTITIONER

## 2017-03-27 NOTE — PROGRESS NOTES
"    Subjective:     Encounter Date:03/27/2017      Patient ID: Kamron Johns is a 56 y.o. male.    Chief Complaint: follow-up of coronary disease.    Problem List:    1. Coronary artery disease  1. Catheter 2012, Sanchez, mild nonflow limiting disease  2. Reported history of myocardial infarction by stress test.  3. Bilateral carotid duplex, 11/1/2016: Right and left carotid artery stenosis 0-49%, minimal carotid artery disease bilaterally  4. Echocardiogram, 11/1/2016: LVEF 52%, mild mitral valve regurgitation, left ventricular diastolic dysfunction (grade I) consistent with impaired relaxation, technically challenging study no bubble contrast done.  5. Echocardiogram, 11/2/2016: LVEF 60%, no cardiac source of embolus, all left ventricular wall segments contract normally.  6. Cardiac catheterization, 2/17/2017:  Hemodynamically significant LAD stenosis treated with 3.0 x 15 Xience EES. Otherwise normal coronary arteries. LVEF 50%.  2. Hypertension  3. Dyslipidemia  4. Reported history of atrial fibrillation  5. Ongoing tobacco abuse  6. Bell's palsy 2016  1. 2016 negative echo  2. 2016 mild bilateral carotid disease  3. MRI 2016 no acute intracranial abnormality.  7. Diverticulosis with history of diverticulitis  8. History nephrolithiasis  9. Cervical spine disease  10. Chronic idiopathic constipation  11. Anxiety   12. Depression  13. Past Surgical History:  1. Umbilical hernia repair  2. Finger surgery  3. Nasal septum repair      History of Present Illness  Patient returns today for follow up with a history of CAD, atrial fibrillation, hypertension, and dyslipidemia. Since last being seen patient underwent cardiac catheterization and stenting to his LAD.  Overall he notes that he \"feels better\".  Has had\"a couple\" of short lasting episodes of chest discomfort.  Denies any current exertional symptoms.denies any syncope, near-syncope, edema.  His primary complaint is of right shoulder and neck pain.  This is " been constant over the course of this past day.  He has been undergoing evaluation by his primary care physician regarding this and is scheduled to have an MRI soon.    Allergies   Allergen Reactions   • Pseudoephedrine      Very hyper         Current Outpatient Prescriptions:   •  aspirin 81 MG EC tablet, Take 81 mg by mouth Daily., Disp: , Rfl:   •  atorvastatin (LIPITOR) 20 MG tablet, Take 1 tablet by mouth Daily., Disp: 90 tablet, Rfl: 3  •  carisoprodol (SOMA) 250 MG tablet, Take 350 mg by mouth 3 (Three) Times a Day As Needed for muscle spasms., Disp: , Rfl:   •  clonazePAM (KlonoPIN) 1 MG tablet, Take 1 mg by mouth 3 (Three) Times a Day As Needed for seizures., Disp: , Rfl:   •  clopidogrel (PLAVIX) 75 MG tablet, Take 1 tablet by mouth Daily., Disp: 30 tablet, Rfl: 11  •  HYDROcodone-acetaminophen (LORTAB)  MG per tablet, Take 1 tablet by mouth 4 (Four) Times a Day As Needed for moderate pain (4-6)., Disp: , Rfl:   •  metoprolol succinate XL (TOPROL-XL) 25 MG 24 hr tablet, Take 25 mg by mouth Daily., Disp: , Rfl:   •  pantoprazole (PROTONIX) 40 MG EC tablet, Take 40 mg by mouth As Needed., Disp: , Rfl:   •  polyethylene glycol (MIRALAX) packet, Take 17 g by mouth Daily., Disp: , Rfl:   •  polyvinyl alcohol (LIQUIFILM) 1.4 % ophthalmic solution, Administer 1 drop into the left eye As Needed for dry eyes., Disp: , Rfl:   •  sodium chloride (OCEAN) 0.65 % nasal spray, 1 spray into each nostril As Needed for congestion., Disp: , Rfl:     The following portions of the patient's history were reviewed and updated as appropriate: allergies, current medications, past family history, past medical history, past social history, past surgical history and problem list.     Review of Systems   Constitution: Negative.   Cardiovascular: Negative.    Respiratory: Negative.    Hematologic/Lymphatic: Negative for bleeding problem. Does not bruise/bleed easily.   Skin: Negative for rash.   Musculoskeletal: Negative for  "muscle weakness and myalgias.   Gastrointestinal: Negative for heartburn, nausea and vomiting.   Neurological: Negative.           Objective:   /90 (BP Location: Right arm, Patient Position: Sitting)  Pulse 67  Ht 76\" (193 cm)  Wt 159 lb 6.4 oz (72.3 kg)  BMI 19.4 kg/m2        Physical Exam   Constitutional: He is oriented to person, place, and time. He appears well-developed and well-nourished.   HENT:   Mouth/Throat: Oropharynx is clear and moist.   Neck: No JVD present. Carotid bruit is not present.   Cardiovascular: Regular rhythm, S1 normal, S2 normal, normal heart sounds and intact distal pulses.    Pulmonary/Chest: Breath sounds normal.   Abdominal: Soft. Bowel sounds are normal. There is no tenderness.   Musculoskeletal: He exhibits no edema.   Neurological: He is alert and oriented to person, place, and time.   Skin: Skin is warm and dry.       Lab Review:    Procedures        Assessment:   Diagnoses and all orders for this visit:    Coronary artery disease involving native coronary artery of native heart without angina pectoris    Essential hypertension    Pure hypercholesterolemia        Plan:  1. Patient may proceed with upcoming MRI as currently scheduled.  2. Encouraged smoking cessation.  Patient notes that he has no intentions to quit at this time.  Discussed risks of smoking regarding his previous stroke and now diagnosed coronary disease.  3. Continue current medications.  4. Revisit in 12 MO, or sooner as needed.    FRANCISCO Peace    "

## 2017-03-30 ENCOUNTER — HOSPITAL ENCOUNTER (OUTPATIENT)
Dept: HOSPITAL 79 - KOH-I | Age: 57
End: 2017-03-30
Attending: FAMILY MEDICINE
Payer: COMMERCIAL

## 2017-03-30 DIAGNOSIS — M54.12: Primary | ICD-10-CM

## 2017-03-30 DIAGNOSIS — Z88.8: ICD-10-CM

## 2017-03-30 DIAGNOSIS — M54.2: ICD-10-CM

## 2017-03-30 DIAGNOSIS — M47.812: ICD-10-CM

## 2017-03-30 DIAGNOSIS — Z88.5: ICD-10-CM

## 2017-05-11 ENCOUNTER — HOSPITAL ENCOUNTER (OUTPATIENT)
Dept: HOSPITAL 79 - OPSV | Age: 57
End: 2017-05-11
Attending: INTERNAL MEDICINE
Payer: COMMERCIAL

## 2017-05-11 DIAGNOSIS — B37.9: Primary | ICD-10-CM

## 2017-05-11 PROCEDURE — G0463 HOSPITAL OUTPT CLINIC VISIT: HCPCS

## 2017-06-23 ENCOUNTER — HOSPITAL ENCOUNTER (OUTPATIENT)
Dept: HOSPITAL 79 - OPSV | Age: 57
End: 2017-06-23
Attending: INTERNAL MEDICINE
Payer: COMMERCIAL

## 2017-06-23 DIAGNOSIS — B37.0: Primary | ICD-10-CM

## 2017-06-23 DIAGNOSIS — B37.81: ICD-10-CM

## 2017-06-23 LAB — BUN/CREATININE RATIO: 14 (ref 0–10)

## 2017-06-23 PROCEDURE — G0463 HOSPITAL OUTPT CLINIC VISIT: HCPCS

## 2017-09-24 ENCOUNTER — HOSPITAL ENCOUNTER (EMERGENCY)
Facility: HOSPITAL | Age: 57
Discharge: HOME | End: 2017-09-24
Attending: EMERGENCY MEDICINE | Admitting: EMERGENCY MEDICINE

## 2017-09-24 ENCOUNTER — APPOINTMENT (OUTPATIENT)
Dept: CT IMAGING | Facility: HOSPITAL | Age: 57
End: 2017-09-24

## 2017-09-24 VITALS
SYSTOLIC BLOOD PRESSURE: 120 MMHG | OXYGEN SATURATION: 98 % | DIASTOLIC BLOOD PRESSURE: 95 MMHG | HEIGHT: 76 IN | HEART RATE: 77 BPM | TEMPERATURE: 98.5 F | BODY MASS INDEX: 19.36 KG/M2 | RESPIRATION RATE: 16 BRPM | WEIGHT: 159 LBS

## 2017-09-24 DIAGNOSIS — Z86.19 HISTORY OF ORAL CANDIDIASIS: ICD-10-CM

## 2017-09-24 DIAGNOSIS — E87.6 HYPOKALEMIA: ICD-10-CM

## 2017-09-24 DIAGNOSIS — K52.3 COLITIS, INDETERMINATE: ICD-10-CM

## 2017-09-24 DIAGNOSIS — K59.09 CHRONIC CONSTIPATION: Primary | ICD-10-CM

## 2017-09-24 DIAGNOSIS — R10.9 ACUTE ABDOMINAL PAIN: ICD-10-CM

## 2017-09-24 LAB
ALBUMIN SERPL-MCNC: 4.3 G/DL (ref 3.2–4.8)
ALBUMIN/GLOB SERPL: 1.4 G/DL (ref 1.5–2.5)
ALP SERPL-CCNC: 92 U/L (ref 25–100)
ALT SERPL W P-5'-P-CCNC: 12 U/L (ref 7–40)
AMPHET+METHAMPHET UR QL: NEGATIVE
AMPHETAMINES UR QL: NEGATIVE
ANION GAP SERPL CALCULATED.3IONS-SCNC: 5 MMOL/L (ref 3–11)
AST SERPL-CCNC: 15 U/L (ref 0–33)
BACTERIA UR QL AUTO: ABNORMAL /HPF
BARBITURATES UR QL SCN: NEGATIVE
BASOPHILS # BLD AUTO: 0.02 10*3/MM3 (ref 0–0.2)
BASOPHILS NFR BLD AUTO: 0.2 % (ref 0–1)
BENZODIAZ UR QL SCN: NEGATIVE
BILIRUB SERPL-MCNC: 0.4 MG/DL (ref 0.3–1.2)
BILIRUB UR QL STRIP: NEGATIVE
BUN BLD-MCNC: 6 MG/DL (ref 9–23)
BUN/CREAT SERPL: 7.5 (ref 7–25)
BUPRENORPHINE SERPL-MCNC: NEGATIVE NG/ML
CALCIUM SPEC-SCNC: 9.5 MG/DL (ref 8.7–10.4)
CANNABINOIDS SERPL QL: NEGATIVE
CHLORIDE SERPL-SCNC: 103 MMOL/L (ref 99–109)
CLARITY UR: CLEAR
CO2 SERPL-SCNC: 31 MMOL/L (ref 20–31)
COCAINE UR QL: NEGATIVE
COLOR UR: YELLOW
CREAT BLD-MCNC: 0.8 MG/DL (ref 0.6–1.3)
DEPRECATED RDW RBC AUTO: 41.4 FL (ref 37–54)
EOSINOPHIL # BLD AUTO: 0.01 10*3/MM3 (ref 0–0.3)
EOSINOPHIL NFR BLD AUTO: 0.1 % (ref 0–3)
ERYTHROCYTE [DISTWIDTH] IN BLOOD BY AUTOMATED COUNT: 12.2 % (ref 11.3–14.5)
GFR SERPL CREATININE-BSD FRML MDRD: 100 ML/MIN/1.73
GLOBULIN UR ELPH-MCNC: 3.1 GM/DL
GLUCOSE BLD-MCNC: 119 MG/DL (ref 70–100)
GLUCOSE UR STRIP-MCNC: NEGATIVE MG/DL
HCT VFR BLD AUTO: 42.5 % (ref 38.9–50.9)
HGB BLD-MCNC: 14.7 G/DL (ref 13.1–17.5)
HGB UR QL STRIP.AUTO: ABNORMAL
HOLD SPECIMEN: NORMAL
HOLD SPECIMEN: NORMAL
HYALINE CASTS UR QL AUTO: ABNORMAL /LPF
IMM GRANULOCYTES # BLD: 0.02 10*3/MM3 (ref 0–0.03)
IMM GRANULOCYTES NFR BLD: 0.2 % (ref 0–0.6)
KETONES UR QL STRIP: NEGATIVE
LEUKOCYTE ESTERASE UR QL STRIP.AUTO: NEGATIVE
LIPASE SERPL-CCNC: 27 U/L (ref 6–51)
LYMPHOCYTES # BLD AUTO: 1.48 10*3/MM3 (ref 0.6–4.8)
LYMPHOCYTES NFR BLD AUTO: 16.7 % (ref 24–44)
MCH RBC QN AUTO: 31.9 PG (ref 27–31)
MCHC RBC AUTO-ENTMCNC: 34.6 G/DL (ref 32–36)
MCV RBC AUTO: 92.2 FL (ref 80–99)
METHADONE UR QL SCN: NEGATIVE
MONOCYTES # BLD AUTO: 0.65 10*3/MM3 (ref 0–1)
MONOCYTES NFR BLD AUTO: 7.4 % (ref 0–12)
NEUTROPHILS # BLD AUTO: 6.66 10*3/MM3 (ref 1.5–8.3)
NEUTROPHILS NFR BLD AUTO: 75.4 % (ref 41–71)
NITRITE UR QL STRIP: NEGATIVE
OPIATES UR QL: POSITIVE
OXYCODONE UR QL SCN: NEGATIVE
PCP UR QL SCN: NEGATIVE
PH UR STRIP.AUTO: 6 [PH] (ref 5–8)
PLATELET # BLD AUTO: 283 10*3/MM3 (ref 150–450)
PMV BLD AUTO: 9.3 FL (ref 6–12)
POTASSIUM BLD-SCNC: 3.3 MMOL/L (ref 3.5–5.5)
PROPOXYPH UR QL: NEGATIVE
PROT SERPL-MCNC: 7.4 G/DL (ref 5.7–8.2)
PROT UR QL STRIP: NEGATIVE
RBC # BLD AUTO: 4.61 10*6/MM3 (ref 4.2–5.76)
RBC # UR: ABNORMAL /HPF
REF LAB TEST METHOD: ABNORMAL
SODIUM BLD-SCNC: 139 MMOL/L (ref 132–146)
SP GR UR STRIP: 1.01 (ref 1–1.03)
SQUAMOUS #/AREA URNS HPF: ABNORMAL /HPF
TRICYCLICS UR QL SCN: NEGATIVE
UROBILINOGEN UR QL STRIP: ABNORMAL
WBC NRBC COR # BLD: 8.84 10*3/MM3 (ref 3.5–10.8)
WBC UR QL AUTO: ABNORMAL /HPF
WHOLE BLOOD HOLD SPECIMEN: NORMAL
WHOLE BLOOD HOLD SPECIMEN: NORMAL

## 2017-09-24 PROCEDURE — 25010000002 HYDROMORPHONE PER 4 MG: Performed by: EMERGENCY MEDICINE

## 2017-09-24 PROCEDURE — 25010000002 ONDANSETRON PER 1 MG: Performed by: EMERGENCY MEDICINE

## 2017-09-24 PROCEDURE — 85025 COMPLETE CBC W/AUTO DIFF WBC: CPT | Performed by: EMERGENCY MEDICINE

## 2017-09-24 PROCEDURE — 25010000002 POTASSIUM CHLORIDE PER 2 MEQ OF POTASSIUM: Performed by: EMERGENCY MEDICINE

## 2017-09-24 PROCEDURE — 80306 DRUG TEST PRSMV INSTRMNT: CPT | Performed by: EMERGENCY MEDICINE

## 2017-09-24 PROCEDURE — 81001 URINALYSIS AUTO W/SCOPE: CPT | Performed by: EMERGENCY MEDICINE

## 2017-09-24 PROCEDURE — 83690 ASSAY OF LIPASE: CPT | Performed by: EMERGENCY MEDICINE

## 2017-09-24 PROCEDURE — 93005 ELECTROCARDIOGRAM TRACING: CPT

## 2017-09-24 PROCEDURE — 96375 TX/PRO/DX INJ NEW DRUG ADDON: CPT

## 2017-09-24 PROCEDURE — 99283 EMERGENCY DEPT VISIT LOW MDM: CPT

## 2017-09-24 PROCEDURE — 80053 COMPREHEN METABOLIC PANEL: CPT | Performed by: EMERGENCY MEDICINE

## 2017-09-24 PROCEDURE — 74176 CT ABD & PELVIS W/O CONTRAST: CPT

## 2017-09-24 PROCEDURE — 96365 THER/PROPH/DIAG IV INF INIT: CPT

## 2017-09-24 RX ORDER — COLCHICINE 0.6 MG/1
0.6 TABLET ORAL 2 TIMES DAILY
Qty: 14 TABLET | Refills: 0 | Status: SHIPPED | OUTPATIENT
Start: 2017-09-24 | End: 2018-04-09

## 2017-09-24 RX ORDER — METRONIDAZOLE 250 MG/1
250 TABLET ORAL 3 TIMES DAILY
Qty: 21 TABLET | Refills: 0 | Status: SHIPPED | OUTPATIENT
Start: 2017-09-24 | End: 2017-10-01

## 2017-09-24 RX ORDER — ONDANSETRON 2 MG/ML
4 INJECTION INTRAMUSCULAR; INTRAVENOUS ONCE
Status: COMPLETED | OUTPATIENT
Start: 2017-09-24 | End: 2017-09-24

## 2017-09-24 RX ORDER — SODIUM CHLORIDE 0.9 % (FLUSH) 0.9 %
10 SYRINGE (ML) INJECTION AS NEEDED
Status: DISCONTINUED | OUTPATIENT
Start: 2017-09-24 | End: 2017-09-25 | Stop reason: HOSPADM

## 2017-09-24 RX ORDER — HYDROMORPHONE HYDROCHLORIDE 1 MG/ML
0.5 INJECTION, SOLUTION INTRAMUSCULAR; INTRAVENOUS; SUBCUTANEOUS ONCE
Status: COMPLETED | OUTPATIENT
Start: 2017-09-24 | End: 2017-09-24

## 2017-09-24 RX ADMIN — ONDANSETRON 4 MG: 2 INJECTION INTRAMUSCULAR; INTRAVENOUS at 20:30

## 2017-09-24 RX ADMIN — HYDROMORPHONE HYDROCHLORIDE 0.5 MG: 1 INJECTION, SOLUTION INTRAMUSCULAR; INTRAVENOUS; SUBCUTANEOUS at 20:31

## 2017-09-24 RX ADMIN — POTASSIUM CHLORIDE 1000 ML/HR: 2 INJECTION, SOLUTION, CONCENTRATE INTRAVENOUS at 20:11

## 2017-09-24 NOTE — ED PROVIDER NOTES
Subjective   HPI Comments: Kamron Johns is a 57 y.o. male who is disabled and lives in [facility].  He is chronically suffering with constipation and low back pain that he is on chronic narcotic pain relievers for.  He presents to the ED with c/o constipation. The patient has not had a normal bowel movement and has not been able to pass gas in about a month and notes only of very small bits of stool. He notes that it onset around the time he was diagnosed with IBS. The patient has tried using laxatives which had no relief. He also complains of associated back pain, abdominal pain, and vomiting bile like material. The patient has no other complaints at this time. Although the patient does occasionally have episodes of constipation, he states that this is much worse and is the first time he believes there is something wrong with him.    He is seen multiple specialists in the past.  He had a colonoscopy a few years ago that he believes was normal.    Patient is a 57 y.o. male presenting with constipation.   History provided by:  Patient  Constipation   Severity:  Unable to specify  Time since last bowel movement: 1 month.  Timing:  Constant  Progression:  Unchanged  Chronicity:  New  Stool description:  Watery  Relieved by:  None tried  Worsened by:  Nothing  Ineffective treatments:  Laxatives  Associated symptoms: abdominal pain, back pain, nausea and vomiting    Associated symptoms: no fever        Review of Systems   Constitutional: Negative for chills and fever.   Gastrointestinal: Positive for abdominal pain, constipation, nausea and vomiting.   Musculoskeletal: Positive for back pain.   All other systems reviewed and are negative.      Past Medical History:   Diagnosis Date   • Abnormal heart rhythm    • Acid reflux    • Anxiety    • Bell's palsy    • Chronic idiopathic constipation    • COPD (chronic obstructive pulmonary disease)    • Depression    • Diverticulitis    • Hyperlipidemia    • Hypertension     • IBS (irritable bowel syndrome)    • Kidney stones    • Myocardial infarction    • Pneumothorax    • Ruptured disc, cervical    • Stroke    • Umbilical hernia    H&P (View-Only)  Date of Service: 1/30/2017 2:00 PM  Horacio Bedoya MD   Cardiology     Encounter Date:01/30/2017       Patient ID: Kamron Johns is a 56 y.o. male.     Chief Complaint:Atrial Fibrillation (Consult); Chest Pain; Shortness of Breath; Pain (Rt lower arm all the way up to his Rt scapula); and Irregular Heart Beat     Past Medical History:  1. Coronary artery disease  1. Catheter 2012, Sanchez, mild nonflow limiting disease        2.   Reported history of myocardial infarction by stress test.  2. Hypertension  3. Dyslipidemia  4. Reported history of atrial fibrillation  5. Ongoing tobacco abuse  6. Bell's palsy 2016  A. 2016 negative echo  B. 2016 mild bilateral carotid disease  C. MRI 2016 no acute intracranial abnormality.  7. Diverticulosis with history of diverticulitis  8. History nephrolithiasis  9. Cervical spine disease  10. Chronic idiopathic constipation  11. Anxiety   12. Depression        Past Surgical History:  1. Umbilical hernia repair  2. Finger surgery  3. Nasal septum repair    Allergies   Allergen Reactions   • Pseudoephedrine      Very hyper       Past Surgical History:   Procedure Laterality Date   • CARDIAC CATHETERIZATION     • CARDIAC CATHETERIZATION N/A 2/17/2017    Procedure: Left Heart Cath;  Surgeon: Horacio Bedoya MD;  Location:  [employer] [facility] [employer] CATH INVASIVE LOCATION;  Service:    • ENDOSCOPY N/A 11/7/2016    Procedure: ESOPHAGOGASTRODUODENOSCOPY;  Surgeon: Henry Smyth DO;  Location:  [facility];  Service:    • FINGER SURGERY     • NASAL SEPTUM SURGERY     • UMBILICAL HERNIA REPAIR     Patient's upper GI endoscopy revealed an aberrant pancreas in the stomach    Family History   Problem Relation Age of Onset   • Dementia Mother    • Emphysema Mother    • Alcohol abuse Father    • Cancer Father    • Heart  disease Father    • Heart attack Father    • Cancer Brother    • Hypertension Brother    • Breast cancer Sister        Social History     Social History   • Marital status:      Spouse name: N/A   • Number of children: N/A   • Years of education: N/A     Social History Main Topics   • Smoking status: Current Every Day Smoker     Packs/day: 2.00     Types: Cigarettes   • Smokeless tobacco: Never Used   • Alcohol use No   • Drug use: No   • Sexual activity: Defer     Other Topics Concern   • None     Social History Narrative         Objective   Physical Exam   Constitutional: He is oriented to person, place, and time. He appears well-developed and well-nourished. No distress.   He smelled of cigarettes smoke.   HENT:   Head: Normocephalic and atraumatic.   Nose: Nose normal.   Mouth/Throat: Oropharynx is clear and moist.   Eyes: Conjunctivae and EOM are normal. Pupils are equal, round, and reactive to light. No scleral icterus.   Neck: Normal range of motion. Neck supple.   Cardiovascular: Normal rate, regular rhythm, normal heart sounds and intact distal pulses.    No murmur heard.  Pulmonary/Chest: Effort normal and breath sounds normal. No respiratory distress.   Abdominal: Soft. Bowel sounds are normal. He exhibits no mass. There is no tenderness. There is no rebound and no guarding.   No organomegaly.   Musculoskeletal: Normal range of motion. He exhibits no edema.   Patient's extremities are normal.   Neurological: He is alert and oriented to person, place, and time. He has normal strength and normal reflexes. No sensory deficit.   Face symmetric, voice strong, tongue midline; Vision, hearing and speech all preserved. Patient has right facial droop chronic from his myocardial infarction.    Skin: Skin is warm and dry.   Psychiatric: He has a normal mood and affect. His behavior is normal.   Nursing note and vitals reviewed.      Procedures         ED Course  ED Course     Recent Results (from the past 24  hour(s))   Comprehensive Metabolic Panel    Collection Time: 09/24/17  4:57 PM   Result Value Ref Range    Glucose 119 (H) 70 - 100 mg/dL    BUN 6 (L) 9 - 23 mg/dL    Creatinine 0.80 0.60 - 1.30 mg/dL    Sodium 139 132 - 146 mmol/L    Potassium 3.3 (L) 3.5 - 5.5 mmol/L    Chloride 103 99 - 109 mmol/L    CO2 31.0 20.0 - 31.0 mmol/L    Calcium 9.5 8.7 - 10.4 mg/dL    Total Protein 7.4 5.7 - 8.2 g/dL    Albumin 4.30 3.20 - 4.80 g/dL    ALT (SGPT) 12 7 - 40 U/L    AST (SGOT) 15 0 - 33 U/L    Alkaline Phosphatase 92 25 - 100 U/L    Total Bilirubin 0.4 0.3 - 1.2 mg/dL    eGFR Non African Amer 100 >60 mL/min/1.73    Globulin 3.1 gm/dL    A/G Ratio 1.4 (L) 1.5 - 2.5 g/dL    BUN/Creatinine Ratio 7.5 7.0 - 25.0    Anion Gap 5.0 3.0 - 11.0 mmol/L   Lipase    Collection Time: 09/24/17  4:57 PM   Result Value Ref Range    Lipase 27 6 - 51 U/L   Light Blue Top    Collection Time: 09/24/17  4:57 PM   Result Value Ref Range    Extra Tube hold for add-on    Green Top (Gel)    Collection Time: 09/24/17  4:57 PM   Result Value Ref Range    Extra Tube Hold for add-ons.    Lavender Top    Collection Time: 09/24/17  4:57 PM   Result Value Ref Range    Extra Tube hold for add-on    Gold Top - SST    Collection Time: 09/24/17  4:57 PM   Result Value Ref Range    Extra Tube Hold for add-ons.    CBC Auto Differential    Collection Time: 09/24/17  4:57 PM   Result Value Ref Range    WBC 8.84 3.50 - 10.80 10*3/mm3    RBC 4.61 4.20 - 5.76 10*6/mm3    Hemoglobin 14.7 13.1 - 17.5 g/dL    Hematocrit 42.5 38.9 - 50.9 %    MCV 92.2 80.0 - 99.0 fL    MCH 31.9 (H) 27.0 - 31.0 pg    MCHC 34.6 32.0 - 36.0 g/dL    RDW 12.2 11.3 - 14.5 %    RDW-SD 41.4 37.0 - 54.0 fl    MPV 9.3 6.0 - 12.0 fL    Platelets 283 150 - 450 10*3/mm3    Neutrophil % 75.4 (H) 41.0 - 71.0 %    Lymphocyte % 16.7 (L) 24.0 - 44.0 %    Monocyte % 7.4 0.0 - 12.0 %    Eosinophil % 0.1 0.0 - 3.0 %    Basophil % 0.2 0.0 - 1.0 %    Immature Grans % 0.2 0.0 - 0.6 %    Neutrophils, Absolute  6.66 1.50 - 8.30 10*3/mm3    Lymphocytes, Absolute 1.48 0.60 - 4.80 10*3/mm3    Monocytes, Absolute 0.65 0.00 - 1.00 10*3/mm3    Eosinophils, Absolute 0.01 0.00 - 0.30 10*3/mm3    Basophils, Absolute 0.02 0.00 - 0.20 10*3/mm3    Immature Grans, Absolute 0.02 0.00 - 0.03 10*3/mm3   Urinalysis With / Culture If Indicated    Collection Time: 09/24/17  7:05 PM   Result Value Ref Range    Color, UA Yellow Yellow, Straw    Appearance, UA Clear Clear    pH, UA 6.0 5.0 - 8.0    Specific Gravity, UA 1.007 1.001 - 1.030    Glucose, UA Negative Negative    Ketones, UA Negative Negative    Bilirubin, UA Negative Negative    Blood, UA Trace (A) Negative    Protein, UA Negative Negative    Leuk Esterase, UA Negative Negative    Nitrite, UA Negative Negative    Urobilinogen, UA 0.2 E.U./dL 0.2 - 1.0 E.U./dL   Urine Drug Screen    Collection Time: 09/24/17  7:05 PM   Result Value Ref Range    THC, Screen, Urine Negative Negative    Phencyclidine (PCP), Urine Negative Negative    Cocaine Screen, Urine Negative Negative    Methamphetamine, Urine Negative Negative    Opiate Screen Positive (A) Negative    Amphetamine Screen, Urine Negative Negative    Benzodiazepine Screen, Urine Negative Negative    Tricyclic Antidepressants Screen Negative Negative    Methadone Screen, Urine Negative Negative    Barbiturates Screen, Urine Negative Negative    Oxycodone Screen, Urine Negative Negative    Propoxyphene Screen Negative Negative    Buprenorphine, Screen, Urine Negative Negative   Urinalysis, Microscopic Only    Collection Time: 09/24/17  7:05 PM   Result Value Ref Range    RBC, UA 0-2 None Seen, 0-2 /HPF    WBC, UA 0-2 (A) None Seen /HPF    Bacteria, UA None Seen None Seen, Trace /HPF    Squamous Epithelial Cells, UA None Seen None Seen, 0-2 /HPF    Hyaline Casts, UA None Seen 0 - 6 /LPF    Methodology Automated Microscopy      Note: In addition to lab results from this visit, the labs listed above may include labs taken at another  facility or during a different encounter within the last 24 hours. Please correlate lab times with ED admission and discharge times for further clarification of the services performed during this visit.    CT Abdomen Pelvis Without Contrast   Final Result   Abnormal   1.  Infectious versus inflammatory sigmoid colitis.   2.  Moderate amount of stool in the proximal colon, correlate for constipation.     No bowel obstruction.      THIS DOCUMENT HAS BEEN ELECTRONICALLY SIGNED BY ALEX RICH MD        Vitals:    09/24/17 2100 09/24/17 2130 09/24/17 2140 09/24/17 2158   BP: 125/74 120/95  120/95   BP Location:       Patient Position:       Pulse:   80 77   Resp:    16   Temp:    98.5 °F (36.9 °C)   TempSrc:       SpO2: 97% 97%  98%   Weight:       Height:         Medications   sodium chloride 0.9 % flush 10 mL (not administered)   potassium chloride 10 mEq in sodium chloride 0.9 % 1,000 mL infusion (0 mL/hr Intravenous Stopped 9/24/17 2139)   HYDROmorphone (DILAUDID) injection 0.5 mg (0.5 mg Intravenous Given 9/24/17 2031)   ondansetron (ZOFRAN) injection 4 mg (4 mg Intravenous Given 9/24/17 2030)     ECG/EMG Results (last 24 hours)     Procedure Component Value Units Date/Time    ECG 12 Lead [[id number]] Collected:  09/24/17 1548     Updated:  09/24/17 1547                        MDM  Number of Diagnoses or Management Options  Acute abdominal pain:   Chronic constipation:   Colitis, indeterminate:   History of oral candidiasis:   Hypokalemia:   Diagnosis management comments:       I have reviewed all available studies at the bedside with the patient and his family.  His labs showed just a very mild hypokalemia are normal white blood cell count.    His CT scan shows constipation and probably mild colitis the sigmoid area that I would favor being due to constipation.  There is no obvious ulcers and there is no obstruction.  He doesn't have stool in the lower colon think he would benefit from an enema for.    In discussion  with the patient he now really readily admits to having constipation since he was a teenager.  He's had seen multiple GI specialist and is been on multiple medications for the constipation but none of which have brought him meaningful relief.  He's been diagnosed with irritable bowel syndrome and opioid-induced constipation in the past as well.  I mention several medications and he believes he is tried them all unsuccessfully.  Discussed with him the fact that he's had multiple interventions by multiple specialists without satisfactory response.  Certainly this is a complex, and chronic issue that the patient's dealt with for years but he feels it is worse.    Currently he is only taking 2 doses of MiraLAX a day.    Since he is tried all conventional therapies.  His been some literature suggests that culture seen may help with refractory constipation I will try him on a short course of this.    I've encouraged use of MiraLAX up to 6 times a day until he has bowel movements the consistency of soft serve ice cream.    He has diarrhea he needs to cut back his dose.    Also talked about the use of probiotics and perhaps some homeopathic remedies that may benefit him.    He is never seen a colorectal expert and I will refer [facility] to [facility] to see if he may be a candidate for other mentions and to  follow-up on HIS mild colitis.    He is worried that he has had oral candidiasis in the past with antibiotics and I'll put him on a short course of Flagyl this mild colitis and I will treat him with nystatin as well if he needs it.    On reexamination here his abdomen was benign.    He'll return to the ER if worsen the way.    All are agreeable with the plan       Amount and/or Complexity of Data Reviewed  Clinical lab tests: reviewed  Tests in the radiology section of CPT®: reviewed  Tests in the medicine section of CPT®: reviewed        Final diagnoses:   History of oral candidiasis   Chronic constipation   Acute  abdominal pain   Colitis, indeterminate   Hypokalemia       Documentation assistance provided by jarred Hernandez.  Information recorded by the scribe was done at my direction and has been verified and validated by me.     Godwin Hernandez  09/24/17 1932       Godwin Hernandez  09/24/17 2003       Julio Cesar Burton MD  09/24/17 7023

## 2017-09-25 NOTE — DISCHARGE INSTRUCTIONS
Try a probiotic like Michael    Try peppermint oil for colonic spasm    Take your MiraLAX up to 6 times a day intake your bowel movements or the consistency of soft serve ice cream.  If you have diarrhea you're taking too much knee need to decrease her dose.

## 2018-03-28 RX ORDER — CLOPIDOGREL BISULFATE 75 MG/1
TABLET ORAL
Qty: 30 TABLET | Refills: 11 | Status: SHIPPED | OUTPATIENT
Start: 2018-03-28 | End: 2018-04-11 | Stop reason: SDUPTHER

## 2018-04-09 ENCOUNTER — OFFICE VISIT (OUTPATIENT)
Dept: CARDIOLOGY | Facility: CLINIC | Age: 58
End: 2018-04-09

## 2018-04-09 VITALS
WEIGHT: 150.6 LBS | HEART RATE: 76 BPM | DIASTOLIC BLOOD PRESSURE: 82 MMHG | SYSTOLIC BLOOD PRESSURE: 130 MMHG | HEIGHT: 76 IN | BODY MASS INDEX: 18.34 KG/M2

## 2018-04-09 DIAGNOSIS — E78.00 PURE HYPERCHOLESTEROLEMIA: ICD-10-CM

## 2018-04-09 DIAGNOSIS — I25.10 CORONARY ARTERY DISEASE INVOLVING NATIVE CORONARY ARTERY OF NATIVE HEART WITHOUT ANGINA PECTORIS: Primary | ICD-10-CM

## 2018-04-09 DIAGNOSIS — I10 ESSENTIAL HYPERTENSION: ICD-10-CM

## 2018-04-09 PROCEDURE — 99213 OFFICE O/P EST LOW 20 MIN: CPT | Performed by: INTERNAL MEDICINE

## 2018-04-09 RX ORDER — ROSUVASTATIN CALCIUM 10 MG/1
10 TABLET, COATED ORAL DAILY
COMMUNITY
End: 2018-04-11 | Stop reason: SDUPTHER

## 2018-04-09 RX ORDER — CARISOPRODOL 350 MG/1
350 TABLET ORAL 3 TIMES DAILY PRN
COMMUNITY
Start: 2018-01-02

## 2018-04-09 RX ORDER — ONDANSETRON HYDROCHLORIDE 8 MG/1
8 TABLET, FILM COATED ORAL DAILY PRN
COMMUNITY
Start: 2018-03-26

## 2018-04-09 NOTE — PROGRESS NOTES
Subjective:     Encounter Date:04/09/2018      Patient ID: Kamron Johns is a 57 y.o. male.    Chief Complaint: Coronary Artery Disease; Hyperlipidemia; and Hypertension    PROBLEM LIST:  1. Coronary artery disease  1. Catheter 2012, Sanchez, mild nonflow limiting disease  2. Reported history of myocardial infarction by stress test.  3. Bilateral carotid duplex, 11/1/2016: Right and left carotid artery stenosis 0-49%, minimal carotid artery disease bilaterally  4. Echocardiogram, 11/1/2016: LVEF 52%, mild mitral valve regurgitation, left ventricular diastolic dysfunction (grade I) consistent with impaired relaxation, technically challenging study no bubble contrast done.  5. Echocardiogram, 11/2/2016: LVEF 60%, no cardiac source of embolus, all left ventricular wall segments contract normally.  6. Cardiac catheterization, 2/17/2017:  Hemodynamically significant LAD stenosis treated with 3.0 x 15 Xience EES. Otherwise normal coronary arteries. LVEF 50%.  2. Hypertension  3. Dyslipidemia  4. Reported history of atrial fibrillation  5. Ongoing tobacco abuse  6. Bell's palsy 2016  1. 2016 negative echo  2. 2016 mild bilateral carotid disease  3. MRI 2016 no acute intracranial abnormality.  7. Diverticulosis with history of diverticulitis  8. History nephrolithiasis  9. Cervical spine disease  10. Chronic idiopathic constipation  11. Anxiety   12. Depression  13. Past Surgical History:  1. Umbilical hernia repair  2. Finger surgery  3. Nasal septum repair    History of Present Illness  Kamron Johns returns today for a one year follow up with a history of coronary artery disease among other cardiac risk factors. Since last visit he has been doing well from a cardiovascular standpoint. In the past couple of months he has experiened few, short episodes of chest pain. One of these instances, he picked up his daughter (115lbs) which resulted in chest pain. Otherwise, Mr. Johns has lost weight since his last visit.  He explains he no longer has the appetite he once did. Furthermore, he reported to the ER two months ago from recurring stomach pain and constipation. Denies any exertional shortness of breath, orthopnea, PND, or palpitations. Mr. Johns continues to smokes 3 packs of cigarettes per day. He remains active by going shooting and walking but admits he would like to regain his muscle. Inquires about drinking protein drinks for his exercise regimen.     Allergies   Allergen Reactions   • Pseudoephedrine      Very hyper       Current Outpatient Prescriptions:   •  aspirin 81 MG EC tablet, Take 81 mg by mouth Daily., Disp: , Rfl:   •  carisoprodol (SOMA) 350 MG tablet, Take 350 mg by mouth 3 (Three) Times a Day As Needed., Disp: , Rfl:   •  clonazePAM (KlonoPIN) 1 MG tablet, Take 1 mg by mouth 3 (Three) Times a Day As Needed for seizures., Disp: , Rfl:   •  clopidogrel (PLAVIX) 75 MG tablet, TAKE ONE TABLET BY MOUTH ONCE DAILY FOR CIRCULATION, Disp: 30 tablet, Rfl: 11  •  HYDROcodone-acetaminophen (LORTAB)  MG per tablet, Take 1 tablet by mouth 4 (Four) Times a Day As Needed for moderate pain (4-6)., Disp: , Rfl:   •  metoprolol tartrate (LOPRESSOR) 25 MG tablet, Take 25 mg by mouth Daily., Disp: , Rfl:   •  ondansetron (ZOFRAN) 8 MG tablet, Take 8 mg by mouth Daily As Needed., Disp: , Rfl:   •  pantoprazole (PROTONIX) 40 MG EC tablet, Take 40 mg by mouth As Needed., Disp: , Rfl:   •  polyethylene glycol (MIRALAX) packet, Take 17 g by mouth Daily., Disp: , Rfl:   •  rosuvastatin (CRESTOR) 10 MG tablet, Take 10 mg by mouth Daily., Disp: , Rfl:   •  sodium chloride (OCEAN) 0.65 % nasal spray, 1 spray into each nostril As Needed for congestion., Disp: , Rfl:     The following portions of the patient's history were reviewed and updated as appropriate: allergies, current medications, past family history, past medical history, past social history, past surgical history and problem list.    Review of Systems  "  Constitution: Negative.   Cardiovascular: Negative.    Respiratory: Negative.    Hematologic/Lymphatic: Negative for bleeding problem. Does not bruise/bleed easily.   Skin: Negative for rash.   Musculoskeletal: Negative for muscle weakness and myalgias.   Gastrointestinal: Negative for heartburn, nausea and vomiting.   Neurological: Negative.         Objective:     Vitals:    04/09/18 1315   BP: 130/82   BP Location: Right arm   Patient Position: Sitting   Pulse: 76   Weight: 68.3 kg (150 lb 9.6 oz)   Height: 193 cm (76\")       Physical Exam   Constitutional: He is oriented to person, place, and time. He appears well-developed and well-nourished.   HENT:   Mouth/Throat: Oropharynx is clear and moist.   Neck: No JVD present. Carotid bruit is not present. No thyromegaly present.   Cardiovascular: Regular rhythm, S1 normal, S2 normal, normal heart sounds and intact distal pulses.  Exam reveals no gallop, no S3 and no S4.    No murmur heard.  Pulses:       Carotid pulses are 2+ on the right side, and 2+ on the left side.       Radial pulses are 2+ on the right side, and 2+ on the left side.   Pulmonary/Chest: Breath sounds normal.   Abdominal: Soft. Bowel sounds are normal. He exhibits no mass. There is no tenderness.   Musculoskeletal: He exhibits no edema.   Neurological: He is alert and oriented to person, place, and time.   Skin: Skin is warm and dry. No rash noted.     Lab Review:    Procedures        Assessment:   Kamron was seen today for coronary artery disease, hyperlipidemia and hypertension.    Diagnoses and all orders for this visit:    Coronary artery disease involving native coronary artery of native heart without angina pectoris    Essential hypertension    Pure hypercholesterolemia    Impression:  1. Coronary artery disease, stable without angina.   2. Hypertension is well-controlled.  3. Dyslipidemia is controlled with statin therapy.  4. Tobacco abuse, 3 packs daily    Plan:  1. Continue current " medications.  2. Smoking cessation encouraged, though doubtful he'll do so  3. Encouraged light exercise and weight lifting to help build muscle and gain weight.   4. Revisit in 12 MO, or sooner as needed.    Scribed for Horacio Bedoya MD by Mor Hoyt. 4/9/2018  1:45 PM    I, Horacoi Bedoya MD, personally performed the services described in this documentation as scribed by the above individual in my presence, and it is both accurate and complete      Please note that portions of this note may have been completed with a voice recognition program. Efforts were made to edit the dictations, but occasionally words are mistranscribed.

## 2018-04-11 RX ORDER — CLOPIDOGREL BISULFATE 75 MG/1
75 TABLET ORAL DAILY
Qty: 30 TABLET | Refills: 11 | Status: SHIPPED | OUTPATIENT
Start: 2018-04-11 | End: 2019-04-22 | Stop reason: SDUPTHER

## 2018-04-11 RX ORDER — ROSUVASTATIN CALCIUM 10 MG/1
10 TABLET, COATED ORAL DAILY
Qty: 30 TABLET | Refills: 11 | Status: SHIPPED | OUTPATIENT
Start: 2018-04-11

## 2018-04-27 ENCOUNTER — TELEPHONE (OUTPATIENT)
Dept: CARDIOLOGY | Facility: CLINIC | Age: 58
End: 2018-04-27

## 2018-04-27 NOTE — TELEPHONE ENCOUNTER
"Patient called to report that for the last week he has had episodes when he lays down at night, he feels his heart is racing and that's \"not like me\".  He also states that practically every day this week in the late afternoon his arm and shoulder, (left) will hurt and he has occasional pains in his heart.  Advised if this happens again, he needs to report to local ER for evaluation.   He states he will.  He states that his BP has been running 120's over 70's.  He states that the episodes are keeping him up at night.  Advised will check with Dr. Bedoya and call him back on Monday for his recommendations.  Again advised if has another episode to seek treatment at local ER.  He advised he will.   "

## 2018-04-30 NOTE — TELEPHONE ENCOUNTER
Left VM advising we can schedule a stress test and holter monitor to evaluate his symptoms if he desires.

## 2019-01-06 ENCOUNTER — APPOINTMENT (OUTPATIENT)
Dept: CT IMAGING | Facility: HOSPITAL | Age: 59
End: 2019-01-06

## 2019-01-06 ENCOUNTER — APPOINTMENT (OUTPATIENT)
Dept: GENERAL RADIOLOGY | Facility: HOSPITAL | Age: 59
End: 2019-01-06

## 2019-01-06 ENCOUNTER — HOSPITAL ENCOUNTER (EMERGENCY)
Facility: HOSPITAL | Age: 59
Discharge: HOME | End: 2019-01-06
Attending: EMERGENCY MEDICINE | Admitting: EMERGENCY MEDICINE

## 2019-01-06 VITALS
HEART RATE: 60 BPM | WEIGHT: 160 LBS | TEMPERATURE: 97.6 F | RESPIRATION RATE: 18 BRPM | OXYGEN SATURATION: 98 % | BODY MASS INDEX: 19.48 KG/M2 | SYSTOLIC BLOOD PRESSURE: 125 MMHG | HEIGHT: 76 IN | DIASTOLIC BLOOD PRESSURE: 91 MMHG

## 2019-01-06 DIAGNOSIS — R00.2 PALPITATIONS: Primary | ICD-10-CM

## 2019-01-06 DIAGNOSIS — R07.9 CHEST PAIN, UNSPECIFIED TYPE: ICD-10-CM

## 2019-01-06 DIAGNOSIS — K60.0 ACUTE ANAL FISSURE: ICD-10-CM

## 2019-01-06 LAB
ALBUMIN SERPL-MCNC: 4.45 G/DL (ref 3.2–4.8)
ALBUMIN/GLOB SERPL: 2.1 G/DL (ref 1.5–2.5)
ALP SERPL-CCNC: 63 U/L (ref 25–100)
ALT SERPL W P-5'-P-CCNC: 12 U/L (ref 7–40)
ANION GAP SERPL CALCULATED.3IONS-SCNC: 6 MMOL/L (ref 3–11)
AST SERPL-CCNC: 13 U/L (ref 0–33)
BASOPHILS # BLD AUTO: 0.02 10*3/MM3 (ref 0–0.2)
BASOPHILS NFR BLD AUTO: 0.4 % (ref 0–1)
BILIRUB SERPL-MCNC: 0.5 MG/DL (ref 0.3–1.2)
BNP SERPL-MCNC: 37 PG/ML (ref 0–100)
BUN BLD-MCNC: 6 MG/DL (ref 9–23)
BUN/CREAT SERPL: 7.5 (ref 7–25)
CALCIUM SPEC-SCNC: 9.3 MG/DL (ref 8.7–10.4)
CHLORIDE SERPL-SCNC: 102 MMOL/L (ref 99–109)
CO2 SERPL-SCNC: 31 MMOL/L (ref 20–31)
CREAT BLD-MCNC: 0.8 MG/DL (ref 0.6–1.3)
DEPRECATED RDW RBC AUTO: 45.2 FL (ref 37–54)
EOSINOPHIL # BLD AUTO: 0.03 10*3/MM3 (ref 0–0.3)
EOSINOPHIL NFR BLD AUTO: 0.5 % (ref 0–3)
ERYTHROCYTE [DISTWIDTH] IN BLOOD BY AUTOMATED COUNT: 12.6 % (ref 11.3–14.5)
GFR SERPL CREATININE-BSD FRML MDRD: 100 ML/MIN/1.73
GLOBULIN UR ELPH-MCNC: 2.2 GM/DL
GLUCOSE BLD-MCNC: 103 MG/DL (ref 70–100)
HCT VFR BLD AUTO: 40.5 % (ref 38.9–50.9)
HGB BLD-MCNC: 13.5 G/DL (ref 13.1–17.5)
HOLD SPECIMEN: NORMAL
HOLD SPECIMEN: NORMAL
IMM GRANULOCYTES # BLD AUTO: 0.01 10*3/MM3 (ref 0–0.03)
IMM GRANULOCYTES NFR BLD AUTO: 0.2 % (ref 0–0.6)
LIPASE SERPL-CCNC: 36 U/L (ref 6–51)
LYMPHOCYTES # BLD AUTO: 2.04 10*3/MM3 (ref 0.6–4.8)
LYMPHOCYTES NFR BLD AUTO: 35.9 % (ref 24–44)
MCH RBC QN AUTO: 32.6 PG (ref 27–31)
MCHC RBC AUTO-ENTMCNC: 33.3 G/DL (ref 32–36)
MCV RBC AUTO: 97.8 FL (ref 80–99)
MONOCYTES # BLD AUTO: 0.42 10*3/MM3 (ref 0–1)
MONOCYTES NFR BLD AUTO: 7.4 % (ref 0–12)
NEUTROPHILS # BLD AUTO: 3.18 10*3/MM3 (ref 1.5–8.3)
NEUTROPHILS NFR BLD AUTO: 55.8 % (ref 41–71)
PLATELET # BLD AUTO: 338 10*3/MM3 (ref 150–450)
PMV BLD AUTO: 9.7 FL (ref 6–12)
POTASSIUM BLD-SCNC: 3.1 MMOL/L (ref 3.5–5.5)
PROT SERPL-MCNC: 6.6 G/DL (ref 5.7–8.2)
RBC # BLD AUTO: 4.14 10*6/MM3 (ref 4.2–5.76)
SODIUM BLD-SCNC: 139 MMOL/L (ref 132–146)
TROPONIN I SERPL-MCNC: 0 NG/ML (ref 0–0.07)
TROPONIN I SERPL-MCNC: 0 NG/ML (ref 0–0.07)
WBC NRBC COR # BLD: 5.69 10*3/MM3 (ref 3.5–10.8)
WHOLE BLOOD HOLD SPECIMEN: NORMAL
WHOLE BLOOD HOLD SPECIMEN: NORMAL

## 2019-01-06 PROCEDURE — 71275 CT ANGIOGRAPHY CHEST: CPT

## 2019-01-06 PROCEDURE — 0 IOPAMIDOL PER 1 ML: Performed by: EMERGENCY MEDICINE

## 2019-01-06 PROCEDURE — 84484 ASSAY OF TROPONIN QUANT: CPT

## 2019-01-06 PROCEDURE — 83880 ASSAY OF NATRIURETIC PEPTIDE: CPT | Performed by: EMERGENCY MEDICINE

## 2019-01-06 PROCEDURE — 99284 EMERGENCY DEPT VISIT MOD MDM: CPT

## 2019-01-06 PROCEDURE — 83690 ASSAY OF LIPASE: CPT | Performed by: EMERGENCY MEDICINE

## 2019-01-06 PROCEDURE — 80053 COMPREHEN METABOLIC PANEL: CPT | Performed by: EMERGENCY MEDICINE

## 2019-01-06 PROCEDURE — 93005 ELECTROCARDIOGRAM TRACING: CPT | Performed by: EMERGENCY MEDICINE

## 2019-01-06 PROCEDURE — 85025 COMPLETE CBC W/AUTO DIFF WBC: CPT | Performed by: EMERGENCY MEDICINE

## 2019-01-06 PROCEDURE — 71045 X-RAY EXAM CHEST 1 VIEW: CPT

## 2019-01-06 RX ORDER — SODIUM CHLORIDE 0.9 % (FLUSH) 0.9 %
10 SYRINGE (ML) INJECTION AS NEEDED
Status: DISCONTINUED | OUTPATIENT
Start: 2019-01-06 | End: 2019-01-06 | Stop reason: HOSPADM

## 2019-01-06 RX ORDER — HYDROXYZINE PAMOATE 50 MG/1
50 CAPSULE ORAL 3 TIMES DAILY PRN
Qty: 20 CAPSULE | Refills: 0 | Status: SHIPPED | OUTPATIENT
Start: 2019-01-06

## 2019-01-06 RX ORDER — ASPIRIN 81 MG/1
324 TABLET, CHEWABLE ORAL ONCE
Status: COMPLETED | OUTPATIENT
Start: 2019-01-06 | End: 2019-01-06

## 2019-01-06 RX ADMIN — IOPAMIDOL 70 ML: 755 INJECTION, SOLUTION INTRAVENOUS at 06:24

## 2019-01-06 RX ADMIN — ASPIRIN 81 MG 243 MG: 81 TABLET ORAL at 05:10

## 2019-01-06 NOTE — ED PROVIDER NOTES
Subjective   58-year-old male presents with a complaint of chest pain.  Patient reports intermittently occurring chest pain in the left upper chest with sharp shooting pains radiating to the left shoulder.  The patient reports the pains are more severe whenever he is lying on his left side attention to sleep.  He reports shortly after falling asleep he will awake with the aforementioned chest pain.  Patient denies any recent trauma to his chest.  He denies any new or different activity that might straining the muscles of his chest.  He reports a known history of coronary artery disease, reports this does not feel similar.  He denies a previous history of DVT or pulmonary embolism.  No known history of abnormal heart rhythm.  No recent fever, cough, congestion, or rhinorrhea.  Patient reports secondary to the symptoms he is very little sleep with last 4 days.  He denies any unexpected weight loss.  He does report intermittently passing blood in stool.  He also intermittently reports shooting sensations down his left leg that feel like electricity.  Patient denies any other aggravating, alleviating, or associated factors.                Review of Systems   Constitutional: Negative for chills, fatigue and fever.   HENT: Negative for congestion, ear pain, postnasal drip, sinus pressure and sore throat.    Eyes: Negative for pain, redness and visual disturbance.   Respiratory: Negative for cough, chest tightness and shortness of breath.    Cardiovascular: Positive for chest pain and palpitations. Negative for leg swelling.   Gastrointestinal: Positive for anal bleeding. Negative for abdominal pain, blood in stool, diarrhea, nausea and vomiting.   Endocrine: Negative for polydipsia and polyuria.   Genitourinary: Negative for difficulty urinating, dysuria, frequency and urgency.   Musculoskeletal: Negative for arthralgias, back pain and neck pain.   Skin: Negative for pallor and rash.   Allergic/Immunologic: Negative for  environmental allergies and immunocompromised state.   Neurological: Negative for dizziness, weakness and headaches.   Hematological: Negative for adenopathy.   Psychiatric/Behavioral: Negative for confusion, self-injury and suicidal ideas. The patient is not nervous/anxious.    All other systems reviewed and are negative.      Past Medical History:   Diagnosis Date   • Abnormal heart rhythm    • Acid reflux    • Anxiety    • Bell's palsy    • C. difficile colitis    • Chronic idiopathic constipation    • COPD (chronic obstructive pulmonary disease) (CMS/HCC)    • Depression    • Diverticulitis    • Hyperlipidemia    • Hypertension    • IBS (irritable bowel syndrome)    • Kidney stones    • Myocardial infarction (CMS/HCC)    • Pneumothorax    • Ruptured disc, cervical    • Stroke (CMS/HCC)    • Umbilical hernia        Allergies   Allergen Reactions   • Pseudoephedrine      Very hyper       Past Surgical History:   Procedure Laterality Date   • CARDIAC CATHETERIZATION     • CARDIAC CATHETERIZATION N/A 2/17/2017    Procedure: Left Heart Cath;  Surgeon: Horacio Bedoya MD;  Location:  [facility] CATH INVASIVE LOCATION;  Service:    • ENDOSCOPY N/A 11/7/2016    Procedure: ESOPHAGOGASTRODUODENOSCOPY;  Surgeon: Henry Smyth DO;  Location:  [facility];  Service:    • FINGER SURGERY     • NASAL SEPTUM SURGERY     • UMBILICAL HERNIA REPAIR         Family History   Problem Relation Age of Onset   • Dementia Mother    • Emphysema Mother    • Alcohol abuse Father    • Cancer Father    • Heart disease Father    • Heart attack Father    • Cancer Brother    • Hypertension Brother    • Breast cancer Sister        Social History     Socioeconomic History   • Marital status:      Spouse name: Not on file   • Number of children: Not on file   • Years of education: Not on file   • Highest education level: Not on file   Tobacco Use   • Smoking status: Current Every Day Smoker     Packs/day: 2.00     Types: Cigarettes    • Smokeless tobacco: Never Used   Substance and Sexual Activity   • Alcohol use: No   • Drug use: No   • Sexual activity: Defer           Objective   Physical Exam   Constitutional: He is oriented to person, place, and time. He appears well-developed and well-nourished.  Non-toxic appearance. No distress.   HENT:   Head: Normocephalic and atraumatic.   Right Ear: External ear normal.   Left Ear: External ear normal.   Nose: Nose normal.   Eyes: EOM and lids are normal. Pupils are equal, round, and reactive to light.   Neck: Normal range of motion. Neck supple. No tracheal deviation present.   Cardiovascular: Normal rate, regular rhythm and normal heart sounds. Exam reveals no gallop, no friction rub and no decreased pulses.   No murmur heard.  Pulmonary/Chest: Effort normal and breath sounds normal. No respiratory distress. He has no decreased breath sounds. He has no wheezes. He has no rhonchi. He has no rales.   Abdominal: Soft. Normal appearance and bowel sounds are normal. There is no tenderness. There is no rebound and no guarding.   Musculoskeletal: Normal range of motion. He exhibits no deformity.   Lymphadenopathy:     He has no cervical adenopathy.   Neurological: He is alert and oriented to person, place, and time. He has normal strength. No cranial nerve deficit or sensory deficit.   Skin: Skin is warm and dry. No rash noted. He is not diaphoretic.   Psychiatric: He has a normal mood and affect. His speech is normal and behavior is normal. Judgment and thought content normal. Cognition and memory are normal.   Nursing note and vitals reviewed.      Procedures           ED Course                  MDM  Number of Diagnoses or Management Options  Acute anal fissure: new and requires workup  Chest pain, unspecified type: new and requires workup  Palpitations: new and requires workup  Diagnosis management comments: The patient is advised to follow-up on an outpatient basis to receive Holter monitor  placement.    He will be given Vistaril to help with anxiety and sleep.    He is advised to sit in a warm sitz bath 4-5 times daily for 20 minutes at a time to help heal fissure.    Follow-up with primary care physician for recheck in 1 week.       Amount and/or Complexity of Data Reviewed  Clinical lab tests: ordered and reviewed  Tests in the radiology section of CPT®: ordered and reviewed  Decide to obtain previous medical records or to obtain history from someone other than the patient: yes  Obtain history from someone other than the patient: yes  Review and summarize past medical records: yes  Independent visualization of images, tracings, or specimens: yes          Final diagnoses:   Palpitations   Chest pain, unspecified type   Acute anal fissure            Haley Kaplan MD  01/06/19 2536

## 2019-01-06 NOTE — DISCHARGE INSTRUCTIONS
Patient is advised to follow-up with cardiology clinic for initiation of holtor monitor.     Take vistaril as needed to help with sleep.     Advised to sit in warm bath water 4-5 times a day at 20 minutes each time to help heal anal fissure.

## 2019-01-07 ENCOUNTER — HOSPITAL ENCOUNTER (OUTPATIENT)
Dept: CARDIOLOGY | Facility: HOSPITAL | Age: 59
Discharge: HOME | End: 2019-01-07
Attending: NURSE PRACTITIONER | Admitting: NURSE PRACTITIONER

## 2019-01-07 ENCOUNTER — OFFICE VISIT (OUTPATIENT)
Dept: CARDIOLOGY | Facility: HOSPITAL | Age: 59
End: 2019-01-07

## 2019-01-07 VITALS
RESPIRATION RATE: 18 BRPM | SYSTOLIC BLOOD PRESSURE: 105 MMHG | DIASTOLIC BLOOD PRESSURE: 65 MMHG | TEMPERATURE: 98.1 F | BODY MASS INDEX: 19.31 KG/M2 | OXYGEN SATURATION: 98 % | HEART RATE: 83 BPM | WEIGHT: 158.6 LBS | HEIGHT: 76 IN

## 2019-01-07 DIAGNOSIS — R07.9 CHEST PAIN, UNSPECIFIED TYPE: ICD-10-CM

## 2019-01-07 DIAGNOSIS — R00.2 PALPITATIONS: Primary | ICD-10-CM

## 2019-01-07 DIAGNOSIS — I25.118 CORONARY ARTERY DISEASE OF NATIVE ARTERY OF NATIVE HEART WITH STABLE ANGINA PECTORIS (HCC): ICD-10-CM

## 2019-01-07 DIAGNOSIS — I10 ESSENTIAL HYPERTENSION: ICD-10-CM

## 2019-01-07 DIAGNOSIS — R00.2 PALPITATIONS: ICD-10-CM

## 2019-01-07 PROCEDURE — 99214 OFFICE O/P EST MOD 30 MIN: CPT | Performed by: NURSE PRACTITIONER

## 2019-01-07 PROCEDURE — 0296T HC EXT ECG > 48HR TO 21 DAY RCRD W/CONECT INTL RCRD: CPT

## 2019-01-07 PROCEDURE — 0298T HOLTER MONITOR - 72 HOUR UP TO 21 DAY: CPT | Performed by: INTERNAL MEDICINE

## 2019-01-07 RX ORDER — NITROGLYCERIN 0.4 MG/1
TABLET SUBLINGUAL
Qty: 25 TABLET | Refills: 0 | Status: SHIPPED | OUTPATIENT
Start: 2019-01-07

## 2019-01-07 NOTE — PROGRESS NOTES
[facility]      Encounter Date:01/07/2019     Kamron CAVAZOS [facility] [address]  [unfilled]    1960    Elizabeth Gasca MD    Kamron Johns is a 58 y.o. male.      Subjective:     Chief Complaint:   Palpitations    HPI   Patient is a 58-year-old male with past medical history significant for coronary artery disease, CVA, hypertension, dyslipidemia, tobacco abuse who [facility] for evaluation of palpitations at the request of Dr. Kaplan. Patient presented to the ED yesterday with chest pain and palpitations. Patient reports since April he has had intermittent palpitations/racing heart while trying to lay on his left side. He says HR will get as high as 160-170s. Yesterday he had left sided chest pain that radiated down his left arm. He reports that if he tries to carry something, such as his grandchild, he feels his heart race and will also get short of breath and get some chest pain. He does not have nitro at home    Patient Active Problem List   Diagnosis   • Oral candidiasis   • Bell's palsy   • Chronic idiopathic constipation   • Hyperlipidemia   • Hypertension   • History of MI (myocardial infarction)   • Headache   • Dysphagia   • Ataxia   • Nausea and vomiting   • Tobacco abuse   • Weight loss, unintentional   • Brainstem stroke (CMS/HCC)   • Coronary artery disease involving native coronary artery of native heart without angina pectoris       Past Medical History:   Diagnosis Date   • Abnormal heart rhythm    • Acid reflux    • Anxiety    • Bell's palsy    • C. difficile colitis    • Chronic idiopathic constipation    • COPD (chronic obstructive pulmonary disease) (CMS/HCC)    • Depression    • Diverticulitis    • Hyperlipidemia    • Hypertension    • IBS (irritable bowel syndrome)    • Kidney stones    • Myocardial infarction (CMS/HCC)    • Pneumothorax    • Ruptured disc, cervical    • Stroke (CMS/HCC)    •  Umbilical hernia        Past Surgical History:   Procedure Laterality Date   • CARDIAC CATHETERIZATION     • CARDIAC CATHETERIZATION N/A 2/17/2017    Procedure: Left Heart Cath;  Surgeon: Horacio Bedoya MD;  Location:  [facility]ATH INVASIVE LOCATION;  Service:    • ENDOSCOPY N/A 11/7/2016    Procedure: ESOPHAGOGASTRODUODENOSCOPY;  Surgeon: Henry Smyth DO;  Location:  [facility];  Service:    • FINGER SURGERY     • NASAL SEPTUM SURGERY     • UMBILICAL HERNIA REPAIR         Family History   Problem Relation Age of Onset   • Dementia Mother    • Emphysema Mother    • Alcohol abuse Father    • Cancer Father    • Heart disease Father    • Heart attack Father    • Cancer Brother    • Hypertension Brother    • Breast cancer Sister    • Heart attack Maternal Grandfather    • Heart attack Paternal Grandfather    • Heart attack Paternal Uncle        Social History     Socioeconomic History   • Marital status:      Spouse name: Not on file   • Number of children: Not on file   • Years of education: Not on file   • Highest education level: Not on file   Social Needs   • Financial resource strain: Not on file   • Food insecurity - worry: Not on file   • Food insecurity - inability: Not on file   • Transportation needs - medical: Not on file   • Transportation needs - non-medical: Not on file   Occupational History   • Not on file   Tobacco Use   • Smoking status: Current Every Day Smoker     Packs/day: 3.50     Types: Cigarettes   • Smokeless tobacco: Never Used   Substance and Sexual Activity   • Alcohol use: No   • Drug use: No   • Sexual activity: Defer   Other Topics Concern   • Not on file   Social History Narrative    Caffeine: 8-10 serving per day.       Allergies   Allergen Reactions   • Pseudoephedrine      Very hyper         Current Outpatient Medications:   •  aspirin 81 MG EC tablet, Take 81 mg by mouth Daily., Disp: , Rfl:   •  carisoprodol (SOMA) 350 MG tablet, Take 350 mg by mouth 3 (Three)  Times a Day As Needed., Disp: , Rfl:   •  clonazePAM (KlonoPIN) 1 MG tablet, Take 1 mg by mouth 3 (Three) Times a Day As Needed for seizures., Disp: , Rfl:   •  clopidogrel (PLAVIX) 75 MG tablet, Take 1 tablet by mouth Daily., Disp: 30 tablet, Rfl: 11  •  HYDROcodone-acetaminophen (LORTAB)  MG per tablet, Take 1 tablet by mouth 4 (Four) Times a Day As Needed for moderate pain (4-6)., Disp: , Rfl:   •  hydrOXYzine (VISTARIL) 50 MG capsule, Take 1 capsule by mouth 3 (Three) Times a Day As Needed for Anxiety for up to 20 doses., Disp: 20 capsule, Rfl: 0  •  metoprolol tartrate (LOPRESSOR) 25 MG tablet, Take 1 tablet by mouth Daily., Disp: 30 tablet, Rfl: 11  •  ondansetron (ZOFRAN) 8 MG tablet, Take 8 mg by mouth Daily As Needed., Disp: , Rfl:   •  pantoprazole (PROTONIX) 40 MG EC tablet, Take 40 mg by mouth As Needed., Disp: , Rfl:   •  polyethylene glycol (MIRALAX) packet, Take 17 g by mouth Daily., Disp: , Rfl:   •  rosuvastatin (CRESTOR) 10 MG tablet, Take 1 tablet by mouth Daily., Disp: 30 tablet, Rfl: 11  •  sodium chloride (OCEAN) 0.65 % nasal spray, 1 spray into each nostril As Needed for congestion., Disp: , Rfl:   •  nitroglycerin (NITROSTAT) 0.4 MG SL tablet, 1 under the tongue as needed for angina, may repeat q5mins for up three doses, Disp: 25 tablet, Rfl: 0    The following portions of the patient's history were reviewed and updated as appropriate: allergies, current medications, past family history, past medical history, past social history, past surgical history and problem list.    Review of Systems   Constitution: Positive for weakness and malaise/fatigue. Negative for chills and fever.   HENT: Positive for congestion.    Eyes: Negative.    Cardiovascular: Positive for chest pain, dyspnea on exertion, irregular heartbeat, near-syncope and palpitations. Negative for claudication, cyanosis, leg swelling, orthopnea, paroxysmal nocturnal dyspnea and syncope.   Respiratory: Positive for shortness of  "breath and sputum production. Negative for cough and snoring.    Endocrine: Positive for cold intolerance and polydipsia.   Hematologic/Lymphatic: Does not bruise/bleed easily.   Skin: Negative for poor wound healing.   Musculoskeletal: Positive for joint pain and muscle cramps.   Gastrointestinal: Positive for abdominal pain, constipation, diarrhea, dysphagia, hemorrhoids, melena, nausea and vomiting. Negative for heartburn and hematemesis.   Genitourinary: Positive for frequency and nocturia. Negative for hematuria.   Neurological: Positive for headaches and light-headedness.   Psychiatric/Behavioral: The patient has insomnia and is nervous/anxious.    Allergic/Immunologic: Positive for environmental allergies.       Objective:     Vitals:    01/07/19 1511 01/07/19 1514 01/07/19 1516   BP: 132/61 112/58 105/65   BP Location: Right arm Left arm Left arm   Patient Position: Sitting Sitting Standing   Pulse: 78  83   Resp: 18     Temp: 98.1 °F (36.7 °C)     TempSrc: Temporal     SpO2: 98%     Weight: 71.9 kg (158 lb 9.6 oz)     Height: 193 cm (76\")         Physical Exam   Constitutional: He is oriented to person, place, and time. He appears well-developed and well-nourished. No distress.   HENT:   Head: Normocephalic.   Eyes: Conjunctivae are normal. Pupils are equal, round, and reactive to light.   Neck: Neck supple. No JVD present. No thyromegaly present.   Cardiovascular: Normal rate, regular rhythm, normal heart sounds and intact distal pulses. Exam reveals no gallop and no friction rub.   No murmur heard.  Pulmonary/Chest: Effort normal and breath sounds normal. No respiratory distress. He has no wheezes. He has no rales. He exhibits no tenderness.   Abdominal: Soft. Bowel sounds are normal.   Musculoskeletal: Normal range of motion. He exhibits no edema.   Neurological: He is alert and oriented to person, place, and time.   Skin: Skin is warm and dry.   Psychiatric: He has a normal mood and affect. His behavior " is normal. Thought content normal.   Vitals reviewed.      Lab and Diagnostic Review:    EKG in ED showed NSR, LAFB    Assessment and Plan:   1. Palpitations  - Holter Monitor - 72 Hour Up To 21 Days; Future  - Adult Transthoracic Echo Complete W/ Cont if Necessary Per Protocol; Future  - Stress Test With Myocardial Perfusion (1 Day); Future    2. Chest pain, unspecified type  - NELSON risk score 3. Patient with known ASCVD and ongoing tobacco abuse  - Adult Transthoracic Echo Complete W/ Cont if Necessary Per Protocol; Future  - Stress Test With Myocardial Perfusion (1 Day); Future    3. Coronary artery disease of native artery of native heart with stable angina pectoris (CMS/HCC)  - RX for nitro PRN  - Patient on statin, ASA and beta blocker   - Adult Transthoracic Echo Complete W/ Cont if Necessary Per Protocol; Future  - Stress Test With Myocardial Perfusion (1 Day); Future    4. Essential hypertension  -Controlled  - Adult Transthoracic Echo Complete W/ Cont if Necessary Per Protocol; Future  - Stress Test With Myocardial Perfusion (1 Day); Future      RV in 6 weeks    It has been a pleasure to participate in the care of this patient.  Patient was instructed to call the [facility] with any questions, concerns, or worsening symptoms.    *Please note that portions of this note were completed with a voice recognition program. Efforts were made to edit the dictations, but occasionally words are mistranscribed.

## 2019-01-11 ENCOUNTER — HOSPITAL ENCOUNTER (OUTPATIENT)
Dept: CARDIOLOGY | Facility: HOSPITAL | Age: 59
Discharge: HOME | End: 2019-01-11
Attending: NURSE PRACTITIONER

## 2019-01-11 ENCOUNTER — APPOINTMENT (OUTPATIENT)
Dept: CARDIOLOGY | Facility: HOSPITAL | Age: 59
End: 2019-01-11
Attending: NURSE PRACTITIONER

## 2019-01-11 ENCOUNTER — TELEPHONE (OUTPATIENT)
Dept: CARDIOLOGY | Facility: HOSPITAL | Age: 59
End: 2019-01-11

## 2019-01-11 VITALS
WEIGHT: 158.51 LBS | SYSTOLIC BLOOD PRESSURE: 120 MMHG | HEIGHT: 76 IN | BODY MASS INDEX: 19.3 KG/M2 | HEART RATE: 66 BPM | DIASTOLIC BLOOD PRESSURE: 80 MMHG

## 2019-01-11 DIAGNOSIS — I10 ESSENTIAL HYPERTENSION: ICD-10-CM

## 2019-01-11 DIAGNOSIS — I25.118 CORONARY ARTERY DISEASE OF NATIVE ARTERY OF NATIVE HEART WITH STABLE ANGINA PECTORIS (HCC): ICD-10-CM

## 2019-01-11 DIAGNOSIS — R07.9 CHEST PAIN, UNSPECIFIED TYPE: ICD-10-CM

## 2019-01-11 DIAGNOSIS — R00.2 PALPITATIONS: ICD-10-CM

## 2019-01-11 LAB
BH CV ECHO MEAS - AO ROOT AREA (BSA CORRECTED): 1.2
BH CV ECHO MEAS - AO ROOT AREA: 4.9 CM^2
BH CV ECHO MEAS - AO ROOT DIAM: 2.5 CM
BH CV ECHO MEAS - BSA(HAYCOCK): 1.9 M^2
BH CV ECHO MEAS - BSA: 2 M^2
BH CV ECHO MEAS - BZI_BMI: 19.2 KILOGRAMS/M^2
BH CV ECHO MEAS - BZI_METRIC_HEIGHT: 193 CM
BH CV ECHO MEAS - BZI_METRIC_WEIGHT: 71.7 KG
BH CV ECHO MEAS - EDV(CUBED): 127.7 ML
BH CV ECHO MEAS - EDV(TEICH): 120.2 ML
BH CV ECHO MEAS - EF(CUBED): 65.6 %
BH CV ECHO MEAS - EF(TEICH): 56.8 %
BH CV ECHO MEAS - ESV(CUBED): 43.9 ML
BH CV ECHO MEAS - ESV(TEICH): 51.9 ML
BH CV ECHO MEAS - FS: 29.9 %
BH CV ECHO MEAS - IVS/LVPW: 1.1
BH CV ECHO MEAS - IVSD: 0.98 CM
BH CV ECHO MEAS - LA DIMENSION: 2.5 CM
BH CV ECHO MEAS - LA/AO: 1
BH CV ECHO MEAS - LAD MAJOR: 3.7 CM
BH CV ECHO MEAS - LAT PEAK E' VEL: 9.8 CM/SEC
BH CV ECHO MEAS - LATERAL E/E' RATIO: 7.3
BH CV ECHO MEAS - LV MASS(C)D: 169.8 GRAMS
BH CV ECHO MEAS - LV MASS(C)DI: 84.7 GRAMS/M^2
BH CV ECHO MEAS - LVIDD: 5 CM
BH CV ECHO MEAS - LVIDS: 3.5 CM
BH CV ECHO MEAS - LVPWD: 0.9 CM
BH CV ECHO MEAS - MED PEAK E' VEL: 6.9 CM/SEC
BH CV ECHO MEAS - MEDIAL E/E' RATIO: 10.3
BH CV ECHO MEAS - MV A MAX VEL: 67.9 CM/SEC
BH CV ECHO MEAS - MV DEC SLOPE: 179.2 CM/SEC^2
BH CV ECHO MEAS - MV DEC TIME: 0.24 SEC
BH CV ECHO MEAS - MV E MAX VEL: 72.7 CM/SEC
BH CV ECHO MEAS - MV E/A: 1.1
BH CV ECHO MEAS - MV P1/2T MAX VEL: 74.7 CM/SEC
BH CV ECHO MEAS - MV P1/2T: 122.1 MSEC
BH CV ECHO MEAS - MVA P1/2T LCG: 2.9 CM^2
BH CV ECHO MEAS - MVA(P1/2T): 1.8 CM^2
BH CV ECHO MEAS - PA ACC SLOPE: 326.9 CM/SEC^2
BH CV ECHO MEAS - PA ACC TIME: 0.14 SEC
BH CV ECHO MEAS - PA PR(ACCEL): 14.8 MMHG
BH CV ECHO MEAS - RV MAX PG: 0.56 MMHG
BH CV ECHO MEAS - RV V1 MAX: 37.4 CM/SEC
BH CV ECHO MEAS - SI(CUBED): 41.8 ML/M^2
BH CV ECHO MEAS - SI(TEICH): 34.1 ML/M^2
BH CV ECHO MEAS - SV(CUBED): 83.7 ML
BH CV ECHO MEAS - SV(TEICH): 68.3 ML
BH CV ECHO MEAS - TAPSE (>1.6): 2.5 CM2
BH CV ECHO MEASUREMENTS AVERAGE E/E' RATIO: 8.71
BH CV STRESS BP STAGE 2: NORMAL
BH CV STRESS BP STAGE 3: NORMAL
BH CV STRESS BP STAGE 4: NORMAL
BH CV STRESS COMMENTS STAGE 1: NORMAL
BH CV STRESS DOSE REGADENOSON STAGE 1: 0.4
BH CV STRESS DURATION MIN STAGE 1: 1
BH CV STRESS DURATION MIN STAGE 2: 1
BH CV STRESS DURATION MIN STAGE 3: 1
BH CV STRESS DURATION MIN STAGE 4: 1
BH CV STRESS DURATION SEC STAGE 2: 0
BH CV STRESS HR STAGE 1: 82
BH CV STRESS HR STAGE 2: 83
BH CV STRESS HR STAGE 3: 107
BH CV STRESS HR STAGE 4: 100
BH CV STRESS PROTOCOL 1: NORMAL
BH CV STRESS RECOVERY BP: NORMAL MMHG
BH CV STRESS RECOVERY HR: 73 BPM
BH CV STRESS STAGE 1: 1
BH CV STRESS STAGE 2: 2
BH CV STRESS STAGE 3: 3
BH CV STRESS STAGE 4: 4
BH CV XLRA - RV BASE: 3.2 CM
BH CV XLRA - RV LENGTH: 6.4 CM
BH CV XLRA - RV MID: 2.4 CM
BH CV XLRA - TDI S': 11.1 CM/SEC
LEFT ATRIUM VOLUME INDEX: 14 ML/M^2
LEFT ATRIUM VOLUME: 28 ML
LV EF 2D ECHO EST: 50 %
LV EF NUC BP: 59 %
MAXIMAL PREDICTED HEART RATE: 164 BPM
MAXIMAL PREDICTED HEART RATE: 164 BPM
PERCENT MAX PREDICTED HR: 70.12 %
STRESS BASELINE BP: NORMAL MMHG
STRESS BASELINE HR: 62 BPM
STRESS PERCENT HR: 82 %
STRESS POST PEAK BP: NORMAL MMHG
STRESS POST PEAK HR: 115 BPM
STRESS TARGET HR: 139 BPM
STRESS TARGET HR: 139 BPM

## 2019-01-11 PROCEDURE — 78452 HT MUSCLE IMAGE SPECT MULT: CPT

## 2019-01-11 PROCEDURE — 25010000002 REGADENOSON 0.4 MG/5ML SOLUTION: Performed by: NURSE PRACTITIONER

## 2019-01-11 PROCEDURE — 93306 TTE W/DOPPLER COMPLETE: CPT

## 2019-01-11 PROCEDURE — 0 TECHNETIUM SESTAMIBI: Performed by: NURSE PRACTITIONER

## 2019-01-11 PROCEDURE — 93017 CV STRESS TEST TRACING ONLY: CPT

## 2019-01-11 PROCEDURE — 93018 CV STRESS TEST I&R ONLY: CPT | Performed by: INTERNAL MEDICINE

## 2019-01-11 PROCEDURE — 93306 TTE W/DOPPLER COMPLETE: CPT | Performed by: INTERNAL MEDICINE

## 2019-01-11 PROCEDURE — 78452 HT MUSCLE IMAGE SPECT MULT: CPT | Performed by: INTERNAL MEDICINE

## 2019-01-11 PROCEDURE — A9500 TC99M SESTAMIBI: HCPCS | Performed by: NURSE PRACTITIONER

## 2019-01-11 RX ADMIN — TECHNETIUM TC 99M SESTAMIBI 1 DOSE: 1 INJECTION INTRAVENOUS at 15:20

## 2019-01-11 RX ADMIN — TECHNETIUM TC 99M SESTAMIBI 1 DOSE: 1 INJECTION INTRAVENOUS at 13:05

## 2019-01-11 RX ADMIN — REGADENOSON 0.4 MG: 0.08 INJECTION, SOLUTION INTRAVENOUS at 15:18

## 2019-04-23 RX ORDER — CLOPIDOGREL BISULFATE 75 MG/1
TABLET ORAL
Qty: 90 TABLET | Refills: 3 | Status: SHIPPED | OUTPATIENT
Start: 2019-04-23

## 2021-07-19 ENCOUNTER — HOSPITAL ENCOUNTER (INPATIENT)
Dept: HOSPITAL 79 - ER1 | Age: 61
LOS: 4 days | Discharge: HOME HEALTH | DRG: 330 | End: 2021-07-23
Attending: INTERNAL MEDICINE | Admitting: INTERNAL MEDICINE
Payer: COMMERCIAL

## 2021-07-19 VITALS — WEIGHT: 140 LBS | HEIGHT: 76 IN | BODY MASS INDEX: 17.05 KG/M2

## 2021-07-19 DIAGNOSIS — M54.5: ICD-10-CM

## 2021-07-19 DIAGNOSIS — F17.210: ICD-10-CM

## 2021-07-19 DIAGNOSIS — Z79.01: ICD-10-CM

## 2021-07-19 DIAGNOSIS — Z82.49: ICD-10-CM

## 2021-07-19 DIAGNOSIS — K59.00: ICD-10-CM

## 2021-07-19 DIAGNOSIS — J44.9: ICD-10-CM

## 2021-07-19 DIAGNOSIS — I48.91: ICD-10-CM

## 2021-07-19 DIAGNOSIS — K57.92: ICD-10-CM

## 2021-07-19 DIAGNOSIS — E44.1: ICD-10-CM

## 2021-07-19 DIAGNOSIS — R00.0: ICD-10-CM

## 2021-07-19 DIAGNOSIS — G89.29: ICD-10-CM

## 2021-07-19 DIAGNOSIS — K56.600: Primary | ICD-10-CM

## 2021-07-19 DIAGNOSIS — K21.9: ICD-10-CM

## 2021-07-19 DIAGNOSIS — K52.9: ICD-10-CM

## 2021-07-19 DIAGNOSIS — Z79.899: ICD-10-CM

## 2021-07-19 DIAGNOSIS — Z82.5: ICD-10-CM

## 2021-07-19 DIAGNOSIS — Z20.822: ICD-10-CM

## 2021-07-19 PROCEDURE — U0002 COVID-19 LAB TEST NON-CDC: HCPCS

## 2021-07-20 LAB
BUN/CREATININE RATIO: 13 (ref 0–10)
BUN/CREATININE RATIO: 13 (ref 0–10)
HGB BLD-MCNC: 12.9 GM/DL (ref 14–17.5)
HGB BLD-MCNC: 15.2 GM/DL (ref 14–17.5)
RED BLOOD COUNT: 4.02 M/UL (ref 4.2–5.5)
RED BLOOD COUNT: 4.64 M/UL (ref 4.2–5.5)
WHITE BLOOD COUNT: 6.6 K/UL (ref 4.5–11)
WHITE BLOOD COUNT: 7.3 K/UL (ref 4.5–11)

## 2021-07-20 PROCEDURE — 0D1E0Z4 BYPASS LARGE INTESTINE TO CUTANEOUS, OPEN APPROACH: ICD-10-PCS | Performed by: SURGERY

## 2021-07-20 PROCEDURE — 0DBG0ZZ EXCISION OF LEFT LARGE INTESTINE, OPEN APPROACH: ICD-10-PCS | Performed by: SURGERY

## 2021-07-21 LAB
BUN/CREATININE RATIO: 9 (ref 0–10)
HGB BLD-MCNC: 11.9 GM/DL (ref 14–17.5)
RED BLOOD COUNT: 3.73 M/UL (ref 4.2–5.5)
WHITE BLOOD COUNT: 7.6 K/UL (ref 4.5–11)

## 2021-07-21 NOTE — NUR
1930 DR WINKLER CALLED TO CHECK ON PT. I LET HER KNOW THAT HE WAS ASKING FOR
HIS CLONAZEPAM. SHE SAID THAT SHE WAS HOLDING HIS HOME MEDS AT THIS TIME AND
WOULD RESTART THEM TOMORROW.

## 2021-07-22 LAB
BUN/CREATININE RATIO: 16 (ref 0–10)
HGB BLD-MCNC: 11.6 GM/DL (ref 14–17.5)
RED BLOOD COUNT: 3.65 M/UL (ref 4.2–5.5)
WHITE BLOOD COUNT: 6.3 K/UL (ref 4.5–11)

## 2021-07-23 LAB
BUN/CREATININE RATIO: 17 (ref 0–10)
HGB BLD-MCNC: 11.6 GM/DL (ref 14–17.5)
RED BLOOD COUNT: 3.67 M/UL (ref 4.2–5.5)
WHITE BLOOD COUNT: 5.8 K/UL (ref 4.5–11)

## 2021-07-26 NOTE — NUR
LATE ENTRY- 7/23/21 AT 1626- CALLED DR WINKLER RELATED TO PATIENT
HAVING CONCERNS WITH COLOSTOMY CARE. PATIENT DEMONSTRATED COLOSTOMY CARE BACK
TO ME. DR WINKLER STATED OKAY TO DISCHARGE HOME WITH COLOSTOMY CARE
INSTRUCTIONS AND TO SEE DR LAZARO IN THE OFFICE ON MONDAY IF HE HAD ANY FURTHER
CONCERNS OR ISSUES.

## 2021-07-28 ENCOUNTER — HOSPITAL ENCOUNTER (OUTPATIENT)
Dept: HOSPITAL 79 - ER1 | Age: 61
Setting detail: OBSERVATION
LOS: 2 days | Discharge: HOME | End: 2021-07-30
Attending: SURGERY | Admitting: SURGERY
Payer: COMMERCIAL

## 2021-07-28 VITALS — HEIGHT: 75.98 IN | WEIGHT: 140 LBS | BODY MASS INDEX: 17.05 KG/M2

## 2021-07-28 DIAGNOSIS — Z86.73: ICD-10-CM

## 2021-07-28 DIAGNOSIS — Z79.82: ICD-10-CM

## 2021-07-28 DIAGNOSIS — Z79.899: ICD-10-CM

## 2021-07-28 DIAGNOSIS — K56.41: Primary | ICD-10-CM

## 2021-07-28 DIAGNOSIS — Z88.5: ICD-10-CM

## 2021-07-28 DIAGNOSIS — Z79.01: ICD-10-CM

## 2021-07-28 DIAGNOSIS — Z93.3: ICD-10-CM

## 2021-07-28 DIAGNOSIS — K56.699: ICD-10-CM

## 2021-07-28 DIAGNOSIS — Z20.822: ICD-10-CM

## 2021-07-28 DIAGNOSIS — Z88.8: ICD-10-CM

## 2021-07-28 DIAGNOSIS — F17.210: ICD-10-CM

## 2021-07-28 DIAGNOSIS — I25.10: ICD-10-CM

## 2021-07-28 LAB
BUN/CREATININE RATIO: 17 (ref 0–10)
HGB BLD-MCNC: 13.4 GM/DL (ref 14–17.5)
RED BLOOD COUNT: 4.12 M/UL (ref 4.2–5.5)
WHITE BLOOD COUNT: 11.2 K/UL (ref 4.5–11)

## 2021-07-28 PROCEDURE — G0378 HOSPITAL OBSERVATION PER HR: HCPCS

## 2021-07-30 NOTE — NUR
COLOSTOMY BAG CHANGED AND STOMA CLEANED.  PT INSTRUCTED ON CARE AS IT WAS
BEING DONE. [facility]TIZ CATH CHANGED TO LEG BAG AND INSTRUCTED ON CATH CARE.  SISTER
AT BEDSIDE AND INCLUDED IN EDUCATION.  PT VERBALLY PROMPTING WHEN DOING
COLOSTOMY CARE.

## 2022-09-25 ENCOUNTER — HOSPITAL ENCOUNTER (EMERGENCY)
Dept: HOSPITAL 79 - ER1 | Age: 62
LOS: 1 days | Discharge: HOME | End: 2022-09-26
Payer: COMMERCIAL

## 2022-09-25 DIAGNOSIS — E86.0: ICD-10-CM

## 2022-09-25 DIAGNOSIS — R07.9: ICD-10-CM

## 2022-09-25 DIAGNOSIS — Z93.3: ICD-10-CM

## 2022-09-25 DIAGNOSIS — Z51.81: ICD-10-CM

## 2022-09-25 DIAGNOSIS — E87.6: Primary | ICD-10-CM

## 2022-09-25 DIAGNOSIS — I11.9: ICD-10-CM

## 2022-09-25 DIAGNOSIS — R13.10: ICD-10-CM

## 2022-09-25 DIAGNOSIS — E78.5: ICD-10-CM

## 2022-09-25 DIAGNOSIS — Z86.73: ICD-10-CM

## 2022-09-25 LAB
BUN/CREATININE RATIO: 11 (ref 0–10)
HGB BLD-MCNC: 15.6 GM/DL (ref 14–17.5)
RED BLOOD COUNT: 4.24 M/UL (ref 4.2–5.5)
WHITE BLOOD COUNT: 7.3 K/UL (ref 4.5–11)

## 2023-07-21 ENCOUNTER — LAB REQUISITION (OUTPATIENT)
Dept: LAB | Facility: HOSPITAL | Age: 63
End: 2023-07-21
Payer: MEDICARE

## 2023-07-21 DIAGNOSIS — I25.10 ATHEROSCLEROTIC HEART DISEASE OF NATIVE CORONARY ARTERY WITHOUT ANGINA PECTORIS: ICD-10-CM

## 2023-07-21 DIAGNOSIS — D64.9 ANEMIA, UNSPECIFIED: ICD-10-CM

## 2023-07-21 DIAGNOSIS — E11.9 TYPE 2 DIABETES MELLITUS WITHOUT COMPLICATIONS: ICD-10-CM

## 2023-07-21 DIAGNOSIS — I10 ESSENTIAL (PRIMARY) HYPERTENSION: ICD-10-CM

## 2023-07-21 LAB
ALBUMIN SERPL-MCNC: 3.7 G/DL (ref 3.5–5.2)
ALBUMIN/GLOB SERPL: 1.2 G/DL
ALP SERPL-CCNC: 93 U/L (ref 39–117)
ALT SERPL W P-5'-P-CCNC: 27 U/L (ref 1–41)
ANION GAP SERPL CALCULATED.3IONS-SCNC: 12 MMOL/L (ref 5–15)
AST SERPL-CCNC: 19 U/L (ref 1–40)
BASOPHILS # BLD AUTO: 0.05 10*3/MM3 (ref 0–0.2)
BASOPHILS NFR BLD AUTO: 0.9 % (ref 0–1.5)
BILIRUB SERPL-MCNC: 0.4 MG/DL (ref 0–1.2)
BUN SERPL-MCNC: 10 MG/DL (ref 8–23)
BUN/CREAT SERPL: 22.2 (ref 7–25)
CALCIUM SPEC-SCNC: 10 MG/DL (ref 8.6–10.5)
CHLORIDE SERPL-SCNC: 99 MMOL/L (ref 98–107)
CHOLEST SERPL-MCNC: 185 MG/DL (ref 0–200)
CO2 SERPL-SCNC: 28 MMOL/L (ref 22–29)
CREAT SERPL-MCNC: 0.45 MG/DL (ref 0.76–1.27)
DEPRECATED RDW RBC AUTO: 55.1 FL (ref 37–54)
EGFRCR SERPLBLD CKD-EPI 2021: 119.8 ML/MIN/1.73
EOSINOPHIL # BLD AUTO: 0.09 10*3/MM3 (ref 0–0.4)
EOSINOPHIL NFR BLD AUTO: 1.5 % (ref 0.3–6.2)
ERYTHROCYTE [DISTWIDTH] IN BLOOD BY AUTOMATED COUNT: 13.9 % (ref 12.3–15.4)
GLOBULIN UR ELPH-MCNC: 3.1 GM/DL
GLUCOSE SERPL-MCNC: 97 MG/DL (ref 65–99)
HBA1C MFR BLD: 5.4 % (ref 4.8–5.6)
HCT VFR BLD AUTO: 34.1 % (ref 37.5–51)
HDLC SERPL-MCNC: 39 MG/DL (ref 40–60)
HGB BLD-MCNC: 10.6 G/DL (ref 13–17.7)
IMM GRANULOCYTES # BLD AUTO: 0.01 10*3/MM3 (ref 0–0.05)
IMM GRANULOCYTES NFR BLD AUTO: 0.2 % (ref 0–0.5)
LDLC SERPL CALC-MCNC: 119 MG/DL (ref 0–100)
LDLC/HDLC SERPL: 2.97 {RATIO}
LYMPHOCYTES # BLD AUTO: 0.9 10*3/MM3 (ref 0.7–3.1)
LYMPHOCYTES NFR BLD AUTO: 15.4 % (ref 19.6–45.3)
MCH RBC QN AUTO: 33.1 PG (ref 26.6–33)
MCHC RBC AUTO-ENTMCNC: 31.1 G/DL (ref 31.5–35.7)
MCV RBC AUTO: 106.6 FL (ref 79–97)
MONOCYTES # BLD AUTO: 0.52 10*3/MM3 (ref 0.1–0.9)
MONOCYTES NFR BLD AUTO: 8.9 % (ref 5–12)
NEUTROPHILS NFR BLD AUTO: 4.28 10*3/MM3 (ref 1.7–7)
NEUTROPHILS NFR BLD AUTO: 73.1 % (ref 42.7–76)
NRBC BLD AUTO-RTO: 0 /100 WBC (ref 0–0.2)
PLATELET # BLD AUTO: 664 10*3/MM3 (ref 140–450)
PMV BLD AUTO: 10.8 FL (ref 6–12)
POTASSIUM SERPL-SCNC: 4.4 MMOL/L (ref 3.5–5.2)
PROT SERPL-MCNC: 6.8 G/DL (ref 6–8.5)
RBC # BLD AUTO: 3.2 10*6/MM3 (ref 4.14–5.8)
SODIUM SERPL-SCNC: 139 MMOL/L (ref 136–145)
TRIGL SERPL-MCNC: 150 MG/DL (ref 0–150)
VIT B12 BLD-MCNC: 631 PG/ML (ref 211–946)
VLDLC SERPL-MCNC: 27 MG/DL (ref 5–40)
WBC NRBC COR # BLD: 5.85 10*3/MM3 (ref 3.4–10.8)

## 2023-07-21 PROCEDURE — 80061 LIPID PANEL: CPT | Performed by: INTERNAL MEDICINE

## 2023-07-21 PROCEDURE — 82607 VITAMIN B-12: CPT | Performed by: INTERNAL MEDICINE

## 2023-07-21 PROCEDURE — 80053 COMPREHEN METABOLIC PANEL: CPT | Performed by: INTERNAL MEDICINE

## 2023-07-21 PROCEDURE — 83036 HEMOGLOBIN GLYCOSYLATED A1C: CPT | Performed by: INTERNAL MEDICINE

## 2023-07-21 PROCEDURE — 85025 COMPLETE CBC W/AUTO DIFF WBC: CPT | Performed by: INTERNAL MEDICINE

## 2024-07-03 ENCOUNTER — OFFICE VISIT (OUTPATIENT)
Dept: UROLOGY | Facility: CLINIC | Age: 64
End: 2024-07-03
Payer: MEDICARE

## 2024-07-03 VITALS
HEART RATE: 70 BPM | SYSTOLIC BLOOD PRESSURE: 118 MMHG | HEIGHT: 76 IN | DIASTOLIC BLOOD PRESSURE: 70 MMHG | BODY MASS INDEX: 19.3 KG/M2

## 2024-07-03 DIAGNOSIS — Z46.6 URINARY CATHETER (FOLEY) CHANGE REQUIRED: ICD-10-CM

## 2024-07-03 DIAGNOSIS — N31.9 NEUROGENIC BLADDER: ICD-10-CM

## 2024-07-03 DIAGNOSIS — R33.9 URINARY RETENTION: Primary | ICD-10-CM

## 2024-07-03 RX ORDER — CYANOCOBALAMIN 1000 UG/ML
1000 INJECTION, SOLUTION INTRAMUSCULAR; SUBCUTANEOUS
COMMUNITY

## 2024-07-03 RX ORDER — GABAPENTIN 300 MG/1
CAPSULE ORAL
COMMUNITY

## 2024-07-03 RX ORDER — TAMSULOSIN HYDROCHLORIDE 0.4 MG/1
CAPSULE ORAL
COMMUNITY

## 2024-07-03 RX ORDER — TIZANIDINE HYDROCHLORIDE 4 MG/1
4 CAPSULE, GELATIN COATED ORAL
COMMUNITY
Start: 2024-02-20 | End: 2024-07-18

## 2024-07-03 RX ORDER — DULOXETIN HYDROCHLORIDE 30 MG/1
CAPSULE, DELAYED RELEASE ORAL
COMMUNITY
Start: 2024-05-30

## 2024-07-03 RX ORDER — APIXABAN 2.5 MG/1
TABLET, FILM COATED ORAL
COMMUNITY
Start: 2024-06-03

## 2024-07-03 RX ORDER — SUVOREXANT 10 MG/1
10 TABLET, FILM COATED ORAL DAILY
COMMUNITY
Start: 2024-06-13

## 2024-07-03 NOTE — PROGRESS NOTES
"Chief Complaint:    Chief Complaint   Patient presents with    Benign Prostatic Hypertrophy       Vital Signs:   /70   Pulse 70   Ht 193 cm (75.98\")   BMI 19.30 kg/m²   Body mass index is 19.3 kg/m².      HPI:  Kamron Johns is a 63 y.o. male who presents today for initial evaluation     History of Present Illness  Mr. Johns presents to the clinic for evaluation of urinary retention.  He has been referred to us by Micahel Estrada MD.  He has a past medical history significant for Bell's palsy, C. difficile colitis, chronic constipation, COPD, diverticulitis, hypertension, irritable bowel syndrome, nephrolithiasis, myocardial infarction, stroke 10 years ago, and recent cholecystectomy with Karuna ostomy bag placement roughly 2 years ago.  Patient states that after surgery for some reason he was unable to walk and had lost feeling from his lower extremities.  He did have to learn to walk again however had chronic issues with urination and was felt to believe neurogenic in nature given history.  He did follow with Dr. Morgan at [facility] [place] for monthly cath changes.  He has had an indwelling Mercado catheter now for roughly 1 year.  He last underwent a cath change in office 1 month ago at [facility] in [facility].  States he has had several traumatic cath changes in the past by his home health nurse.  He was recently started on Flomax for unknown reasons.  Dr. Morgan had advised him he did have prostatic enlargement but incomplete emptying was due to an irritant genic bladder.  I did change out the patient's 18 Persian coudé catheter and received yellow urine return with no acute complications.      Past Medical History:  Past Medical History:   Diagnosis Date    Abnormal heart rhythm     Acid reflux     Anxiety     Bell's palsy     C. difficile colitis     Chronic idiopathic constipation     COPD (chronic obstructive pulmonary disease)     Depression     Diverticulitis     Hyperlipidemia  "    Hypertension     IBS (irritable bowel syndrome)     Kidney stones     Myocardial infarction     Pneumothorax     Ruptured disc, cervical     Stroke     Umbilical hernia        Current Meds:  Current Outpatient Medications   Medication Sig Dispense Refill    Belsomra 10 MG tablet Take 10 mg by mouth Daily.      clonazePAM (KlonoPIN) 1 MG tablet Take 1 tablet by mouth 3 (Three) Times a Day As Needed for Seizures.      cyanocobalamin 1000 MCG/ML injection Inject 1 mL into the appropriate muscle as directed by prescriber Every 14 (Fourteen) Days.      DULoxetine (CYMBALTA) 30 MG capsule TAKE ONE CAPSULE BY MOUTH EVERY NIGHT AT BEDTIME FOR 1 WEEK, THEN INCREASE TO TWO CAPSULES EVERY NIGHT AT BEDTIME FOR MOOD      Eliquis 2.5 MG tablet tablet TAKE ONE TABLET BY MOUTH TWO TIMES A DAY FOR CIRCULATION      gabapentin (NEURONTIN) 300 MG capsule TAKE THREE CAPSULES BY MOUTH THREE TIMES A DAY AS NEEDED FOR PERIPHERAL AND NEUROPATHIC PAIN      HYDROcodone-acetaminophen (LORTAB)  MG per tablet Take 1 tablet by mouth 4 (Four) Times a Day As Needed for Moderate Pain.      hydrOXYzine (VISTARIL) 50 MG capsule Take 1 capsule by mouth 3 (Three) Times a Day As Needed for Anxiety for up to 20 doses. 20 capsule 0    metoprolol tartrate (LOPRESSOR) 25 MG tablet Take 1 tablet by mouth Daily. 30 tablet 11    nitroglycerin (NITROSTAT) 0.4 MG SL tablet 1 under the tongue as needed for angina, may repeat q5mins for up three doses 25 tablet 0    ondansetron (ZOFRAN) 8 MG tablet Take 1 tablet by mouth Daily As Needed.      pantoprazole (PROTONIX) 40 MG EC tablet Take 1 tablet by mouth As Needed.      polyethylene glycol (MIRALAX) packet Take 17 g by mouth Daily.      rosuvastatin (CRESTOR) 10 MG tablet Take 1 tablet by mouth Daily. 30 tablet 11    sodium chloride (OCEAN) 0.65 % nasal spray 1 spray into the nostril(s) as directed by provider As Needed for Congestion.      tamsulosin (FLOMAX) 0.4 MG capsule 24 hr capsule TAKE ONE CAPSULE  BY MOUTH EVERY DAY FOR PROSTATE      TiZANidine (ZANAFLEX) 4 MG capsule Take 1 capsule by mouth.       No current facility-administered medications for this visit.        Allergies:   Allergies   Allergen Reactions    Pseudoephedrine      Very hyper        Past Surgical History:  Past Surgical History:   Procedure Laterality Date    CARDIAC CATHETERIZATION      CARDIAC CATHETERIZATION N/A 2/17/2017    Procedure: Left Heart Cath;  Surgeon: Horacio Bedoya MD;  Location:  [facility];  Service:     ENDOSCOPY N/A 11/7/2016    Procedure: ESOPHAGOGASTRODUODENOSCOPY;  Surgeon: Henry Smyth DO;  Location:  [facility];  Service:     FINGER SURGERY      NASAL SEPTUM SURGERY      UMBILICAL HERNIA REPAIR         Social History:  Social History     Socioeconomic History    Marital status:    Tobacco Use    Smoking status: Every Day     Current packs/day: 3.50     Types: Cigarettes     Passive exposure: Current    Smokeless tobacco: Never   Vaping Use    Vaping status: Never Used   Substance and Sexual Activity    Alcohol use: No    Drug use: No    Sexual activity: Defer       Family History:  Family History   Problem Relation Age of Onset    Dementia Mother     Emphysema Mother     Alcohol abuse Father     Cancer Father     Heart disease Father     Heart attack Father     Cancer Brother     Hypertension Brother     Breast cancer Sister     Heart attack Maternal Grandfather     Heart attack Paternal Grandfather     Heart attack Paternal Uncle        Review of Systems:  Review of Systems   Constitutional:  Negative for fatigue, fever and unexpected weight change.   Respiratory:  Negative for chest tightness and shortness of breath.    Cardiovascular:  Negative for chest pain.   Gastrointestinal:  Negative for abdominal pain, constipation, diarrhea, nausea and vomiting.   Genitourinary:  Positive for difficulty urinating. Negative for dysuria, frequency and urgency.   Skin:  Negative for  rash.   Psychiatric/Behavioral:  Negative for confusion and suicidal ideas.        Physical Exam:  Physical Exam  Constitutional:       General: He is not in acute distress.     Appearance: Normal appearance.   HENT:      Head: Normocephalic and atraumatic.      Nose: Nose normal.      Mouth/Throat:      Mouth: Mucous membranes are moist.   Eyes:      Conjunctiva/sclera: Conjunctivae normal.   Cardiovascular:      Rate and Rhythm: Normal rate and regular rhythm.      Pulses: Normal pulses.      Heart sounds: Normal heart sounds.   Pulmonary:      Effort: Pulmonary effort is normal.      Breath sounds: Normal breath sounds.   Abdominal:      General: Bowel sounds are normal.      Palpations: Abdomen is soft.   Musculoskeletal:      Cervical back: Normal range of motion.      Comments: Decreased mobility and lower extremity uses a wheelchair for ambulation   Skin:     General: Skin is warm.   Neurological:      Mental Status: He is alert and oriented to person, place, and time. Mental status is at baseline.      Sensory: Sensory deficit present.      Motor: Weakness present.      Coordination: Coordination abnormal.   Psychiatric:         Mood and Affect: Mood normal.         Behavior: Behavior normal.         Thought Content: Thought content normal.         Judgment: Judgment normal.             Recent Image (CT and/or KUB):   CT Abdomen and Pelvis: No results found for this or any previous visit.     CT Stone Protocol: No results found for this or any previous visit.     KUB: No results found for this or any previous visit.       Labs:  Brief Urine Lab Results       None          No visits with results within 3 Month(s) from this visit.   Latest known visit with results is:   Lab Requisition on 07/21/2023   Component Date Value Ref Range Status    Glucose 07/21/2023 97  65 - 99 mg/dL Final    BUN 07/21/2023 10  8 - 23 mg/dL Final    Creatinine 07/21/2023 0.45 (L)  0.76 - 1.27 mg/dL Final    Sodium 07/21/2023 139   136 - 145 mmol/L Final    Potassium 07/21/2023 4.4  3.5 - 5.2 mmol/L Final    Chloride 07/21/2023 99  98 - 107 mmol/L Final    CO2 07/21/2023 28.0  22.0 - 29.0 mmol/L Final    Calcium 07/21/2023 10.0  8.6 - 10.5 mg/dL Final    Total Protein 07/21/2023 6.8  6.0 - 8.5 g/dL Final    Albumin 07/21/2023 3.7  3.5 - 5.2 g/dL Final    ALT (SGPT) 07/21/2023 27  1 - 41 U/L Final    AST (SGOT) 07/21/2023 19  1 - 40 U/L Final    Alkaline Phosphatase 07/21/2023 93  39 - 117 U/L Final    Total Bilirubin 07/21/2023 0.4  0.0 - 1.2 mg/dL Final    Globulin 07/21/2023 3.1  gm/dL Final    A/G Ratio 07/21/2023 1.2  g/dL Final    BUN/Creatinine Ratio 07/21/2023 22.2  7.0 - 25.0 Final    Anion Gap 07/21/2023 12.0  5.0 - 15.0 mmol/L Final    eGFR 07/21/2023 119.8  >60.0 mL/min/1.73 Final    Total Cholesterol 07/21/2023 185  0 - 200 mg/dL Final    Triglycerides 07/21/2023 150  0 - 150 mg/dL Final    HDL Cholesterol 07/21/2023 39 (L)  40 - 60 mg/dL Final    LDL Cholesterol  07/21/2023 119 (H)  0 - 100 mg/dL Final    VLDL Cholesterol 07/21/2023 27  5 - 40 mg/dL Final    LDL/HDL Ratio 07/21/2023 2.97   Final    Hemoglobin A1C 07/21/2023 5.40  4.80 - 5.60 % Final    Vitamin B-12 07/21/2023 631  211 - 946 pg/mL Final    WBC 07/21/2023 5.85  3.40 - 10.80 10*3/mm3 Final    RBC 07/21/2023 3.20 (L)  4.14 - 5.80 10*6/mm3 Final    Hemoglobin 07/21/2023 10.6 (L)  13.0 - 17.7 g/dL Final    Hematocrit 07/21/2023 34.1 (L)  37.5 - 51.0 % Final    MCV 07/21/2023 106.6 (H)  79.0 - 97.0 fL Final    MCH 07/21/2023 33.1 (H)  26.6 - 33.0 pg Final    MCHC 07/21/2023 31.1 (L)  31.5 - 35.7 g/dL Final    RDW 07/21/2023 13.9  12.3 - 15.4 % Final    RDW-SD 07/21/2023 55.1 (H)  37.0 - 54.0 fl Final    MPV 07/21/2023 10.8  6.0 - 12.0 fL Final    Platelets 07/21/2023 664 (H)  140 - 450 10*3/mm3 Final    Neutrophil % 07/21/2023 73.1  42.7 - 76.0 % Final    Lymphocyte % 07/21/2023 15.4 (L)  19.6 - 45.3 % Final    Monocyte % 07/21/2023 8.9  5.0 - 12.0 % Final    Eosinophil  "% 07/21/2023 1.5  0.3 - 6.2 % Final    Basophil % 07/21/2023 0.9  0.0 - 1.5 % Final    Immature Grans % 07/21/2023 0.2  0.0 - 0.5 % Final    Neutrophils, Absolute 07/21/2023 4.28  1.70 - 7.00 10*3/mm3 Final    Lymphocytes, Absolute 07/21/2023 0.90  0.70 - 3.10 10*3/mm3 Final    Monocytes, Absolute 07/21/2023 0.52  0.10 - 0.90 10*3/mm3 Final    Eosinophils, Absolute 07/21/2023 0.09  0.00 - 0.40 10*3/mm3 Final    Basophils, Absolute 07/21/2023 0.05  0.00 - 0.20 10*3/mm3 Final    Immature Grans, Absolute 07/21/2023 0.01  0.00 - 0.05 10*3/mm3 Final    nRBC 07/21/2023 0.0  0.0 - 0.2 /100 WBC Final        Procedure:  Cath Change, Simple    Date/Time: 7/3/2024 3:22 PM    Performed by: Bacilio Bardales PA-C  Authorized by: Bacilio Bardales PA-C  Consent: Verbal consent obtained.  Risks and benefits: risks, benefits and alternatives were discussed  Consent given by: patient  Patient understanding: patient states understanding of the procedure being performed  Patient consent: the patient's understanding of the procedure matches consent given  Procedure consent: procedure consent matches procedure scheduled  Relevant documents: relevant documents present and verified  Test results: test results available and properly labeled  Site marked: the operative site was marked  Imaging studies: imaging studies available  Patient identity confirmed: verbally with patient  Time out: Immediately prior to procedure a \"time out\" was called to verify the correct patient, procedure, equipment, support staff and site/side marked as required.  Preparation: Patient was prepped and draped in the usual sterile fashion.  Local anesthesia used: yes    Anesthesia:  Local anesthesia used: yes    Sedation:  Patient sedated: no    Patient tolerance: patient tolerated the procedure well with no immediate complications           Assessment/Plan:   Problem List Items Addressed This Visit       Urinary retention - Primary    Relevant Medications    " tamsulosin (FLOMAX) 0.4 MG capsule 24 hr capsule    Neurogenic bladder       Health Maintenance:   Health Maintenance Due   Topic Date Due    URINE MICROALBUMIN  Never done    COLORECTAL CANCER SCREENING  Never done    Pneumococcal Vaccine 0-64 (1 of 2 - PCV) Never done    TDAP/TD VACCINES (1 - Tdap) Never done    ZOSTER VACCINE (1 of 2) Never done    HEPATITIS C SCREENING  Never done    DIABETIC FOOT EXAM  Never done    DIABETIC EYE EXAM  10/31/2017    RSV Vaccine - Adults (1 - 1-dose 60+ series) Never done    COVID-19 Vaccine (2 - 2023-24 season) 09/01/2023    HEMOGLOBIN A1C  05/01/2024    LIPID PANEL  07/21/2024        Smoking Counseling: Everyday smoker.  Never used smokeless tobacco.  Counseling given however not record at this time.    Urine Incontinence: Patient reports that he is not currently experiencing any symptoms of urinary incontinence.    Patient was given instructions and counseling regarding his condition or for health maintenance advice. Please see specific information pulled into the AVS if appropriate.    Patient Education:   Neurogenic bladder/urinary retention -patient continues with a chronic indwelling Mercado catheter at this time.  Given patient's medical history and poor health conditions I do not believe he would benefit from a suprapubic catheter.  He continues with chronic indwelling Mercado catheter monthly changes with no acute complications.  Recommending to do repeat month Mercado cath changes in office here.  Patients 18 coudé catheter was exchanged in office with no acute complications.  Will have the patient follow-up in roughly 1 month for reevaluation and repeat cath change.    Visit Diagnoses:    ICD-10-CM ICD-9-CM   1. Urinary retention  R33.9 788.20   2. Neurogenic bladder  N31.9 596.54       Meds Ordered During Visit:  No orders of the defined types were placed in this encounter.      Follow Up Appointment: 1 month  No follow-ups on file.      This document has been  electronically signed by Bacilio Bardales PA-C   July 3, 2024 15:22 EDT    Part of this note may be an electronic transcription/translation of spoken language to printed text using the Dragon Dictation System.

## 2024-08-05 ENCOUNTER — OFFICE VISIT (OUTPATIENT)
Dept: UROLOGY | Facility: CLINIC | Age: 64
End: 2024-08-05
Payer: MEDICARE

## 2024-08-05 VITALS
HEIGHT: 76 IN | SYSTOLIC BLOOD PRESSURE: 111 MMHG | BODY MASS INDEX: 19.3 KG/M2 | HEART RATE: 106 BPM | DIASTOLIC BLOOD PRESSURE: 72 MMHG

## 2024-08-05 DIAGNOSIS — Z46.6 URINARY CATHETER (FOLEY) CHANGE REQUIRED: Primary | ICD-10-CM

## 2024-08-05 PROCEDURE — 1160F RVW MEDS BY RX/DR IN RCRD: CPT

## 2024-08-05 PROCEDURE — 3078F DIAST BP <80 MM HG: CPT

## 2024-08-05 PROCEDURE — 1159F MED LIST DOCD IN RCRD: CPT

## 2024-08-05 PROCEDURE — 3074F SYST BP LT 130 MM HG: CPT

## 2024-08-05 PROCEDURE — 51702 INSERT TEMP BLADDER CATH: CPT

## 2024-08-05 NOTE — PROGRESS NOTES
"Chief Complaint:    Chief Complaint   Patient presents with    Encounter for Mercado catheter placement       Vital Signs:   /72 (BP Location: Right arm, Patient Position: Sitting, Cuff Size: Adult)   Pulse 106   Ht 193 cm (75.98\")   BMI 19.30 kg/m²   Body mass index is 19.3 kg/m².      HPI:  Kamron Johns is a 64 y.o. male who presents today for follow up    History of Present Illness  Mr. Johns returns to the clinic today for follow-up for neurogenic bladder and urinary retention.  Patient was seen in office 1 month ago and underwent an 18 French coudé cath change at that time with no complications.  Caregiver does report he was diagnosed with a urinary tract infection and was given IV antibiotics for roughly 2 weeks.  His urine in office today is yellow with some sediments but no signs of gross hematuria.  I did change out his 18 French coudé catheter and he tolerated procedure well with no acute complications.      Past Medical History:  Past Medical History:   Diagnosis Date    Abnormal heart rhythm     Acid reflux     Anxiety     Bell's palsy     C. difficile colitis     Chronic idiopathic constipation     COPD (chronic obstructive pulmonary disease)     Depression     Diverticulitis     Hyperlipidemia     Hypertension     IBS (irritable bowel syndrome)     Kidney stones     Myocardial infarction     Pneumothorax     Ruptured disc, cervical     Stroke     Umbilical hernia        Current Meds:  Current Outpatient Medications   Medication Sig Dispense Refill    Belsomra 10 MG tablet Take 10 mg by mouth Daily.      clonazePAM (KlonoPIN) 1 MG tablet Take 1 tablet by mouth 3 (Three) Times a Day As Needed for Seizures.      cyanocobalamin 1000 MCG/ML injection Inject 1 mL into the appropriate muscle as directed by prescriber Every 14 (Fourteen) Days.      DULoxetine (CYMBALTA) 30 MG capsule TAKE ONE CAPSULE BY MOUTH EVERY NIGHT AT BEDTIME FOR 1 WEEK, THEN INCREASE TO TWO CAPSULES EVERY NIGHT AT BEDTIME " FOR MOOD      Eliquis 2.5 MG tablet tablet TAKE ONE TABLET BY MOUTH TWO TIMES A DAY FOR CIRCULATION      gabapentin (NEURONTIN) 300 MG capsule TAKE THREE CAPSULES BY MOUTH THREE TIMES A DAY AS NEEDED FOR PERIPHERAL AND NEUROPATHIC PAIN      HYDROcodone-acetaminophen (LORTAB)  MG per tablet Take 1 tablet by mouth 4 (Four) Times a Day As Needed for Moderate Pain.      hydrOXYzine (VISTARIL) 50 MG capsule Take 1 capsule by mouth 3 (Three) Times a Day As Needed for Anxiety for up to 20 doses. 20 capsule 0    metoprolol tartrate (LOPRESSOR) 25 MG tablet Take 1 tablet by mouth Daily. 30 tablet 11    nitroglycerin (NITROSTAT) 0.4 MG SL tablet 1 under the tongue as needed for angina, may repeat q5mins for up three doses 25 tablet 0    ondansetron (ZOFRAN) 8 MG tablet Take 1 tablet by mouth Daily As Needed.      pantoprazole (PROTONIX) 40 MG EC tablet Take 1 tablet by mouth As Needed.      polyethylene glycol (MIRALAX) packet Take 17 g by mouth Daily.      rosuvastatin (CRESTOR) 10 MG tablet Take 1 tablet by mouth Daily. 30 tablet 11    sodium chloride (OCEAN) 0.65 % nasal spray 1 spray into the nostril(s) as directed by provider As Needed for Congestion.      tamsulosin (FLOMAX) 0.4 MG capsule 24 hr capsule TAKE ONE CAPSULE BY MOUTH EVERY DAY FOR PROSTATE       No current facility-administered medications for this visit.        Allergies:   Allergies   Allergen Reactions    Pseudoephedrine      Very hyper        Past Surgical History:  Past Surgical History:   Procedure Laterality Date    CARDIAC CATHETERIZATION      CARDIAC CATHETERIZATION N/A 2/17/2017    Procedure: Left Heart Cath;  Surgeon: Horacio Bedoya MD;  Location:  [facility];  Service:     ENDOSCOPY N/A 11/7/2016    Procedure: ESOPHAGOGASTRODUODENOSCOPY;  Surgeon: Henry Smyth DO;  Location:  [facility];  Service:     FINGER SURGERY      NASAL SEPTUM SURGERY      UMBILICAL HERNIA REPAIR         Social History:  Social  History     Socioeconomic History    Marital status:    Tobacco Use    Smoking status: Every Day     Current packs/day: 3.50     Types: Cigarettes     Passive exposure: Current    Smokeless tobacco: Never   Vaping Use    Vaping status: Never Used   Substance and Sexual Activity    Alcohol use: No    Drug use: No    Sexual activity: Defer       Family History:  Family History   Problem Relation Age of Onset    Dementia Mother     Emphysema Mother     Alcohol abuse Father     Cancer Father     Heart disease Father     Heart attack Father     Cancer Brother     Hypertension Brother     Breast cancer Sister     Heart attack Maternal Grandfather     Heart attack Paternal Grandfather     Heart attack Paternal Uncle        Review of Systems:  Review of Systems   Constitutional:  Negative for fatigue, fever and unexpected weight change.   Respiratory:  Negative for chest tightness and shortness of breath.    Cardiovascular:  Negative for chest pain.   Gastrointestinal:  Negative for abdominal pain, constipation, diarrhea, nausea and vomiting.   Genitourinary:  Positive for difficulty urinating. Negative for dysuria, frequency and urgency.   Skin:  Negative for rash.   Psychiatric/Behavioral:  Negative for confusion and suicidal ideas.        Physical Exam:  Physical Exam  Constitutional:       General: He is not in acute distress.     Appearance: Normal appearance.   HENT:      Head: Normocephalic and atraumatic.      Nose: Nose normal.      Mouth/Throat:      Mouth: Mucous membranes are moist.   Eyes:      Conjunctiva/sclera: Conjunctivae normal.   Cardiovascular:      Rate and Rhythm: Normal rate and regular rhythm.      Pulses: Normal pulses.      Heart sounds: Normal heart sounds.   Pulmonary:      Effort: Pulmonary effort is normal.      Breath sounds: Normal breath sounds.   Abdominal:      General: Bowel sounds are normal.      Palpations: Abdomen is soft.   Genitourinary:     Penis: Normal.       Testes:  Normal.   Musculoskeletal:         General: Normal range of motion.      Cervical back: Normal range of motion.   Skin:     General: Skin is warm.   Neurological:      General: No focal deficit present.      Mental Status: He is alert and oriented to person, place, and time.   Psychiatric:         Mood and Affect: Mood normal.         Behavior: Behavior normal.         Thought Content: Thought content normal.         Judgment: Judgment normal.         Recent Image (CT and/or KUB):   CT Abdomen and Pelvis: No results found for this or any previous visit.     CT Stone Protocol: No results found for this or any previous visit.     KUB: No results found for this or any previous visit.       Labs:  Brief Urine Lab Results       None          No visits with results within 3 Month(s) from this visit.   Latest known visit with results is:   Lab Requisition on 07/21/2023   Component Date Value Ref Range Status    Glucose 07/21/2023 97  65 - 99 mg/dL Final    BUN 07/21/2023 10  8 - 23 mg/dL Final    Creatinine 07/21/2023 0.45 (L)  0.76 - 1.27 mg/dL Final    Sodium 07/21/2023 139  136 - 145 mmol/L Final    Potassium 07/21/2023 4.4  3.5 - 5.2 mmol/L Final    Chloride 07/21/2023 99  98 - 107 mmol/L Final    CO2 07/21/2023 28.0  22.0 - 29.0 mmol/L Final    Calcium 07/21/2023 10.0  8.6 - 10.5 mg/dL Final    Total Protein 07/21/2023 6.8  6.0 - 8.5 g/dL Final    Albumin 07/21/2023 3.7  3.5 - 5.2 g/dL Final    ALT (SGPT) 07/21/2023 27  1 - 41 U/L Final    AST (SGOT) 07/21/2023 19  1 - 40 U/L Final    Alkaline Phosphatase 07/21/2023 93  39 - 117 U/L Final    Total Bilirubin 07/21/2023 0.4  0.0 - 1.2 mg/dL Final    Globulin 07/21/2023 3.1  gm/dL Final    A/G Ratio 07/21/2023 1.2  g/dL Final    BUN/Creatinine Ratio 07/21/2023 22.2  7.0 - 25.0 Final    Anion Gap 07/21/2023 12.0  5.0 - 15.0 mmol/L Final    eGFR 07/21/2023 119.8  >60.0 mL/min/1.73 Final    Total Cholesterol 07/21/2023 185  0 - 200 mg/dL Final    Triglycerides 07/21/2023  150  0 - 150 mg/dL Final    HDL Cholesterol 07/21/2023 39 (L)  40 - 60 mg/dL Final    LDL Cholesterol  07/21/2023 119 (H)  0 - 100 mg/dL Final    VLDL Cholesterol 07/21/2023 27  5 - 40 mg/dL Final    LDL/HDL Ratio 07/21/2023 2.97   Final    Hemoglobin A1C 07/21/2023 5.40  4.80 - 5.60 % Final    Vitamin B-12 07/21/2023 631  211 - 946 pg/mL Final    WBC 07/21/2023 5.85  3.40 - 10.80 10*3/mm3 Final    RBC 07/21/2023 3.20 (L)  4.14 - 5.80 10*6/mm3 Final    Hemoglobin 07/21/2023 10.6 (L)  13.0 - 17.7 g/dL Final    Hematocrit 07/21/2023 34.1 (L)  37.5 - 51.0 % Final    MCV 07/21/2023 106.6 (H)  79.0 - 97.0 fL Final    MCH 07/21/2023 33.1 (H)  26.6 - 33.0 pg Final    MCHC 07/21/2023 31.1 (L)  31.5 - 35.7 g/dL Final    RDW 07/21/2023 13.9  12.3 - 15.4 % Final    RDW-SD 07/21/2023 55.1 (H)  37.0 - 54.0 fl Final    MPV 07/21/2023 10.8  6.0 - 12.0 fL Final    Platelets 07/21/2023 664 (H)  140 - 450 10*3/mm3 Final    Neutrophil % 07/21/2023 73.1  42.7 - 76.0 % Final    Lymphocyte % 07/21/2023 15.4 (L)  19.6 - 45.3 % Final    Monocyte % 07/21/2023 8.9  5.0 - 12.0 % Final    Eosinophil % 07/21/2023 1.5  0.3 - 6.2 % Final    Basophil % 07/21/2023 0.9  0.0 - 1.5 % Final    Immature Grans % 07/21/2023 0.2  0.0 - 0.5 % Final    Neutrophils, Absolute 07/21/2023 4.28  1.70 - 7.00 10*3/mm3 Final    Lymphocytes, Absolute 07/21/2023 0.90  0.70 - 3.10 10*3/mm3 Final    Monocytes, Absolute 07/21/2023 0.52  0.10 - 0.90 10*3/mm3 Final    Eosinophils, Absolute 07/21/2023 0.09  0.00 - 0.40 10*3/mm3 Final    Basophils, Absolute 07/21/2023 0.05  0.00 - 0.20 10*3/mm3 Final    Immature Grans, Absolute 07/21/2023 0.01  0.00 - 0.05 10*3/mm3 Final    nRBC 07/21/2023 0.0  0.0 - 0.2 /100 WBC Final        Procedure:  Cath Change, Simple    Date/Time: 8/5/2024 12:18 PM    Performed by: Bacilio Bardales PA-C  Authorized by: Bacilio Bardales PA-C  Consent: Verbal consent obtained.  Risks and benefits: risks, benefits and alternatives were  discussed  Consent given by: patient  Patient understanding: patient states understanding of the procedure being performed  Patient consent: the patient's understanding of the procedure matches consent given  Procedure consent: procedure consent matches procedure scheduled  Relevant documents: relevant documents present and verified  Test results: test results available and properly labeled  Site marked: the operative site was marked  Imaging studies: imaging studies available  Patient identity confirmed: verbally with patient  Preparation: Patient was prepped and draped in the usual sterile fashion.  Local anesthesia used: yes    Anesthesia:  Local anesthesia used: yes    Sedation:  Patient sedated: no    Patient tolerance: patient tolerated the procedure well with no immediate complications           Assessment/Plan:   Problem List Items Addressed This Visit       Urinary catheter (Mercado) change required - Primary       Health Maintenance:   Health Maintenance Due   Topic Date Due    URINE MICROALBUMIN  Never done    COLORECTAL CANCER SCREENING  Never done    Pneumococcal Vaccine 0-64 (1 of 2 - PCV) Never done    TDAP/TD VACCINES (1 - Tdap) Never done    ZOSTER VACCINE (1 of 2) Never done    HEPATITIS C SCREENING  Never done    DIABETIC FOOT EXAM  Never done    DIABETIC EYE EXAM  10/31/2017    COVID-19 Vaccine (3 - 2023-24 season) 09/01/2023    HEMOGLOBIN A1C  05/01/2024    LIPID PANEL  07/21/2024    INFLUENZA VACCINE  08/01/2024        Smoking Counseling: Everyday smoker.  Never used smokeless tobacco.  Counseling given.    Urine Incontinence: Patient reports that he is not currently experiencing any symptoms of urinary incontinence.    Patient was given instructions and counseling regarding his condition or for health maintenance advice. Please see specific information pulled into the AVS if appropriate.    Patient Education:   Mercado catheter exchange -did change out the patient's 18 coudé Mercado catheter in  office today with no acute complications.  Yellow urine was received and return we will have him follow-up again in 1 month for repeat cath change.  Vies him return to clinic sooner if needed.  He verbalized understanding.    Visit Diagnoses:    ICD-10-CM ICD-9-CM   1. Urinary catheter (Mercado) change required  Z46.6 V53.6       Meds Ordered During Visit:  No orders of the defined types were placed in this encounter.      Follow Up Appointment:   No follow-ups on file.      This document has been electronically signed by Bacilio Bardales PA-C   August 5, 2024 12:18 EDT    Part of this note may be an electronic transcription/translation of spoken language to printed text using the Dragon Dictation System.

## 2024-09-09 ENCOUNTER — OFFICE VISIT (OUTPATIENT)
Dept: UROLOGY | Facility: CLINIC | Age: 64
End: 2024-09-09
Payer: MEDICARE

## 2024-09-09 DIAGNOSIS — Z46.6 URINARY CATHETER (FOLEY) CHANGE REQUIRED: Primary | ICD-10-CM

## 2024-09-09 PROCEDURE — 51702 INSERT TEMP BLADDER CATH: CPT

## 2024-09-09 PROCEDURE — 1160F RVW MEDS BY RX/DR IN RCRD: CPT

## 2024-09-09 PROCEDURE — 1159F MED LIST DOCD IN RCRD: CPT

## 2024-09-09 NOTE — PROGRESS NOTES
Chief Complaint:    Chief Complaint   Patient presents with    Catheter Change       Vital Signs:   There were no vitals taken for this visit.  There is no height or weight on file to calculate BMI.      HPI:  Kamron Johns is a 64 y.o. male who presents today for follow up    History of Present Illness  Mr. Johns returns to the clinic today for follow-up for urinary retention secondary to neurogenic bladder.  He is last seen 1 month ago and underwent a catheter exchange at that time.  His 18 French straight catheter was exchanged in office today with no acute complications.  He received yellow urine in return.  He tolerated procedure well.  Will place him back again in 1 month for repeat cath exchange.      Past Medical History:  Past Medical History:   Diagnosis Date    Abnormal heart rhythm     Acid reflux     Anxiety     Bell's palsy     C. difficile colitis     Chronic idiopathic constipation     COPD (chronic obstructive pulmonary disease)     Depression     Diverticulitis     Hyperlipidemia     Hypertension     IBS (irritable bowel syndrome)     Kidney stones     Myocardial infarction     Pneumothorax     Ruptured disc, cervical     Stroke     Umbilical hernia        Current Meds:  Current Outpatient Medications   Medication Sig Dispense Refill    Belsomra 10 MG tablet Take 10 mg by mouth Daily.      clonazePAM (KlonoPIN) 1 MG tablet Take 1 tablet by mouth 3 (Three) Times a Day As Needed for Seizures.      cyanocobalamin 1000 MCG/ML injection Inject 1 mL into the appropriate muscle as directed by prescriber Every 14 (Fourteen) Days.      DULoxetine (CYMBALTA) 30 MG capsule TAKE ONE CAPSULE BY MOUTH EVERY NIGHT AT BEDTIME FOR 1 WEEK, THEN INCREASE TO TWO CAPSULES EVERY NIGHT AT BEDTIME FOR MOOD      Eliquis 2.5 MG tablet tablet TAKE ONE TABLET BY MOUTH TWO TIMES A DAY FOR CIRCULATION      gabapentin (NEURONTIN) 300 MG capsule TAKE THREE CAPSULES BY MOUTH THREE TIMES A DAY AS NEEDED FOR PERIPHERAL AND  NEUROPATHIC PAIN      HYDROcodone-acetaminophen (LORTAB)  MG per tablet Take 1 tablet by mouth 4 (Four) Times a Day As Needed for Moderate Pain.      hydrOXYzine (VISTARIL) 50 MG capsule Take 1 capsule by mouth 3 (Three) Times a Day As Needed for Anxiety for up to 20 doses. 20 capsule 0    metoprolol tartrate (LOPRESSOR) 25 MG tablet Take 1 tablet by mouth Daily. 30 tablet 11    nitroglycerin (NITROSTAT) 0.4 MG SL tablet 1 under the tongue as needed for angina, may repeat q5mins for up three doses 25 tablet 0    ondansetron (ZOFRAN) 8 MG tablet Take 1 tablet by mouth Daily As Needed.      pantoprazole (PROTONIX) 40 MG EC tablet Take 1 tablet by mouth As Needed.      polyethylene glycol (MIRALAX) packet Take 17 g by mouth Daily.      rosuvastatin (CRESTOR) 10 MG tablet Take 1 tablet by mouth Daily. 30 tablet 11    sodium chloride (OCEAN) 0.65 % nasal spray 1 spray into the nostril(s) as directed by provider As Needed for Congestion.      tamsulosin (FLOMAX) 0.4 MG capsule 24 hr capsule TAKE ONE CAPSULE BY MOUTH EVERY DAY FOR PROSTATE       No current facility-administered medications for this visit.        Allergies:   Allergies   Allergen Reactions    Pseudoephedrine      Very hyper        Past Surgical History:  Past Surgical History:   Procedure Laterality Date    CARDIAC CATHETERIZATION      CARDIAC CATHETERIZATION N/A 2/17/2017    Procedure: Left Heart Cath;  Surgeon: Horacio Bedoya MD;  Location:  [facility]ATH [facility]VASIVE [facility];  Service:     ENDOSCOPY N/A 11/7/2016    Procedure: ESOPHAGOGASTRODUODENOSCOPY;  Surgeon: Henry Smyth DO;  Location:  [facility];  Service:     FINGER SURGERY      NASAL SEPTUM SURGERY      UMBILICAL HERNIA REPAIR         Social History:  Social History     Socioeconomic History    Marital status:    Tobacco Use    Smoking status: Every Day     Current packs/day: 3.50     Types: Cigarettes     Passive exposure: Current    Smokeless tobacco: Never   Vaping Use     Vaping status: Never Used   Substance and Sexual Activity    Alcohol use: No    Drug use: No    Sexual activity: Defer       Family History:  Family History   Problem Relation Age of Onset    Dementia Mother     Emphysema Mother     Alcohol abuse Father     Cancer Father     Heart disease Father     Heart attack Father     Cancer Brother     Hypertension Brother     Breast cancer Sister     Heart attack Maternal Grandfather     Heart attack Paternal Grandfather     Heart attack Paternal Uncle        Review of Systems:  Review of Systems   Constitutional:  Negative for chills, fatigue, fever and unexpected weight change.   Respiratory:  Negative for cough, chest tightness, shortness of breath and wheezing.    Cardiovascular:  Negative for chest pain and leg swelling.   Gastrointestinal:  Negative for abdominal pain, constipation, diarrhea, nausea and vomiting.   Genitourinary:  Positive for difficulty urinating. Negative for dysuria, frequency, penile swelling, scrotal swelling, testicular pain and urgency.   Musculoskeletal:  Negative for back pain and joint swelling.   Skin:  Negative for rash.   Neurological:  Negative for dizziness and headaches.   Psychiatric/Behavioral:  Negative for confusion and suicidal ideas.        Physical Exam:  Physical Exam  Constitutional:       General: He is not in acute distress.     Appearance: Normal appearance.   HENT:      Head: Normocephalic and atraumatic.      Nose: Nose normal.      Mouth/Throat:      Mouth: Mucous membranes are moist.   Eyes:      Conjunctiva/sclera: Conjunctivae normal.   Cardiovascular:      Rate and Rhythm: Normal rate and regular rhythm.      Pulses: Normal pulses.      Heart sounds: Normal heart sounds.   Pulmonary:      Effort: Pulmonary effort is normal.      Breath sounds: Normal breath sounds.   Abdominal:      General: Bowel sounds are normal.      Palpations: Abdomen is soft.   Genitourinary:     Penis: Normal.       Testes: Normal.       Comments: 18 Syriac catheter in place with yellow urine noted in collection tubing and bag.  Musculoskeletal:         General: Normal range of motion.      Cervical back: Normal range of motion.   Skin:     General: Skin is warm.   Neurological:      General: No focal deficit present.      Mental Status: He is alert and oriented to person, place, and time.   Psychiatric:         Mood and Affect: Mood normal.         Behavior: Behavior normal.         Thought Content: Thought content normal.         Judgment: Judgment normal.                      Recent Image (CT and/or KUB):   CT Abdomen and Pelvis: No results found for this or any previous visit.     CT Stone Protocol: No results found for this or any previous visit.     KUB: No results found for this or any previous visit.       Labs:  Brief Urine Lab Results       None          No visits with results within 3 Month(s) from this visit.   Latest known visit with results is:   Lab Requisition on 07/21/2023   Component Date Value Ref Range Status    Glucose 07/21/2023 97  65 - 99 mg/dL Final    BUN 07/21/2023 10  8 - 23 mg/dL Final    Creatinine 07/21/2023 0.45 (L)  0.76 - 1.27 mg/dL Final    Sodium 07/21/2023 139  136 - 145 mmol/L Final    Potassium 07/21/2023 4.4  3.5 - 5.2 mmol/L Final    Chloride 07/21/2023 99  98 - 107 mmol/L Final    CO2 07/21/2023 28.0  22.0 - 29.0 mmol/L Final    Calcium 07/21/2023 10.0  8.6 - 10.5 mg/dL Final    Total Protein 07/21/2023 6.8  6.0 - 8.5 g/dL Final    Albumin 07/21/2023 3.7  3.5 - 5.2 g/dL Final    ALT (SGPT) 07/21/2023 27  1 - 41 U/L Final    AST (SGOT) 07/21/2023 19  1 - 40 U/L Final    Alkaline Phosphatase 07/21/2023 93  39 - 117 U/L Final    Total Bilirubin 07/21/2023 0.4  0.0 - 1.2 mg/dL Final    Globulin 07/21/2023 3.1  gm/dL Final    A/G Ratio 07/21/2023 1.2  g/dL Final    BUN/Creatinine Ratio 07/21/2023 22.2  7.0 - 25.0 Final    Anion Gap 07/21/2023 12.0  5.0 - 15.0 mmol/L Final    eGFR 07/21/2023 119.8  >60.0  mL/min/1.73 Final    Total Cholesterol 07/21/2023 185  0 - 200 mg/dL Final    Triglycerides 07/21/2023 150  0 - 150 mg/dL Final    HDL Cholesterol 07/21/2023 39 (L)  40 - 60 mg/dL Final    LDL Cholesterol  07/21/2023 119 (H)  0 - 100 mg/dL Final    VLDL Cholesterol 07/21/2023 27  5 - 40 mg/dL Final    LDL/HDL Ratio 07/21/2023 2.97   Final    Hemoglobin A1C 07/21/2023 5.40  4.80 - 5.60 % Final    Vitamin B-12 07/21/2023 631  211 - 946 pg/mL Final    WBC 07/21/2023 5.85  3.40 - 10.80 10*3/mm3 Final    RBC 07/21/2023 3.20 (L)  4.14 - 5.80 10*6/mm3 Final    Hemoglobin 07/21/2023 10.6 (L)  13.0 - 17.7 g/dL Final    Hematocrit 07/21/2023 34.1 (L)  37.5 - 51.0 % Final    MCV 07/21/2023 106.6 (H)  79.0 - 97.0 fL Final    MCH 07/21/2023 33.1 (H)  26.6 - 33.0 pg Final    MCHC 07/21/2023 31.1 (L)  31.5 - 35.7 g/dL Final    RDW 07/21/2023 13.9  12.3 - 15.4 % Final    RDW-SD 07/21/2023 55.1 (H)  37.0 - 54.0 fl Final    MPV 07/21/2023 10.8  6.0 - 12.0 fL Final    Platelets 07/21/2023 664 (H)  140 - 450 10*3/mm3 Final    Neutrophil % 07/21/2023 73.1  42.7 - 76.0 % Final    Lymphocyte % 07/21/2023 15.4 (L)  19.6 - 45.3 % Final    Monocyte % 07/21/2023 8.9  5.0 - 12.0 % Final    Eosinophil % 07/21/2023 1.5  0.3 - 6.2 % Final    Basophil % 07/21/2023 0.9  0.0 - 1.5 % Final    Immature Grans % 07/21/2023 0.2  0.0 - 0.5 % Final    Neutrophils, Absolute 07/21/2023 4.28  1.70 - 7.00 10*3/mm3 Final    Lymphocytes, Absolute 07/21/2023 0.90  0.70 - 3.10 10*3/mm3 Final    Monocytes, Absolute 07/21/2023 0.52  0.10 - 0.90 10*3/mm3 Final    Eosinophils, Absolute 07/21/2023 0.09  0.00 - 0.40 10*3/mm3 Final    Basophils, Absolute 07/21/2023 0.05  0.00 - 0.20 10*3/mm3 Final    Immature Grans, Absolute 07/21/2023 0.01  0.00 - 0.05 10*3/mm3 Final    nRBC 07/21/2023 0.0  0.0 - 0.2 /100 WBC Final        Procedure:  Cath Change, Simple    Date/Time: 9/9/2024 3:23 PM    Performed by: Bacilio Bardales PA-C  Authorized by: Bacilio Bardales PA-C   Consent: Verbal consent obtained.  Risks and benefits: risks, benefits and alternatives were discussed  Consent given by: patient  Patient understanding: patient states understanding of the procedure being performed  Patient consent: the patient's understanding of the procedure matches consent given  Procedure consent: procedure consent matches procedure scheduled  Relevant documents: relevant documents present and verified  Test results: test results available and properly labeled  Site marked: the operative site was marked  Imaging studies: imaging studies available  Patient identity confirmed: verbally with patient  Preparation: Patient was prepped and draped in the usual sterile fashion.  Local anesthesia used: yes    Anesthesia:  Local anesthesia used: yes  Local Anesthetic: topical anesthetic    Sedation:  Patient sedated: no    Patient tolerance: patient tolerated the procedure well with no immediate complications           Assessment/Plan:   Problem List Items Addressed This Visit       Urinary catheter (Mercado) change required - Primary       Health Maintenance:   Health Maintenance Due   Topic Date Due    URINE MICROALBUMIN  Never done    COLORECTAL CANCER SCREENING  Never done    Pneumococcal Vaccine 0-64 (1 of 2 - PCV) Never done    TDAP/TD VACCINES (1 - Tdap) Never done    ZOSTER VACCINE (1 of 2) Never done    HEPATITIS C SCREENING  Never done    DIABETIC FOOT EXAM  Never done    DIABETIC EYE EXAM  10/31/2017    HEMOGLOBIN A1C  05/01/2024    LIPID PANEL  07/21/2024    COVID-19 Vaccine (3 - 2023-24 season) 09/01/2024    INFLUENZA VACCINE  08/01/2024        Smoking Counseling: Everyday smoker.  Never used smokeless tobacco.  Counseling given however not record at this time.    Urine Incontinence: Patient reports that he is not currently experiencing any symptoms of urinary incontinence.    Patient was given instructions and counseling regarding his condition or for health maintenance advice. Please see  specific information pulled into the AVS if appropriate.    Patient Education:   Mercado catheter exchange -did exchange the patient's 18 straight catheter in office today and he tolerated procedure with no acute events.  Vies him return to clinic sooner if needed otherwise we will place him back in 1 month for reevaluation.    Visit Diagnoses:    ICD-10-CM ICD-9-CM   1. Urinary catheter (Mercado) change required  Z46.6 V53.6       Meds Ordered During Visit:  No orders of the defined types were placed in this encounter.      Follow Up Appointment: 1 month  No follow-ups on file.      This document has been electronically signed by Baciloi Bardales PA-C   September 9, 2024 15:25 EDT    Part of this note may be an electronic transcription/translation of spoken language to printed text using the Dragon Dictation System.

## 2024-10-14 ENCOUNTER — OFFICE VISIT (OUTPATIENT)
Dept: UROLOGY | Facility: CLINIC | Age: 64
End: 2024-10-14
Payer: MEDICARE

## 2024-10-14 VITALS — HEART RATE: 104 BPM | SYSTOLIC BLOOD PRESSURE: 110 MMHG | DIASTOLIC BLOOD PRESSURE: 82 MMHG

## 2024-10-14 DIAGNOSIS — Z46.6 URINARY CATHETER (FOLEY) CHANGE REQUIRED: Primary | ICD-10-CM

## 2024-10-14 PROCEDURE — 3074F SYST BP LT 130 MM HG: CPT

## 2024-10-14 PROCEDURE — 1160F RVW MEDS BY RX/DR IN RCRD: CPT

## 2024-10-14 PROCEDURE — 1159F MED LIST DOCD IN RCRD: CPT

## 2024-10-14 PROCEDURE — 51702 INSERT TEMP BLADDER CATH: CPT

## 2024-10-14 PROCEDURE — 3079F DIAST BP 80-89 MM HG: CPT

## 2024-10-14 NOTE — PROGRESS NOTES
Chief Complaint:    Chief Complaint   Patient presents with    Urinary catheter (Mercado) change required       Vital Signs:   /82   Pulse 104   There is no height or weight on file to calculate BMI.      HPI:  Kamron Johns is a 64 y.o. male who presents today for follow up    History of Present Illness  Mr. Johns presents to the clinic for monthly cath changes.  He has a medical history of neurogenic bladder and was last seen in office and underwent a Mercado catheter exchange with no acute complications.  He returns today with no new complaints.  He states his catheter has been draining well.  He does endorse some intermittent bladder pains but states this is normal.  He denies any hematuria, fever, chills, or blood clots in the urine.  I did change his 16 French straight catheter out and he tolerated it well with no acute complications.      Past Medical History:  Past Medical History:   Diagnosis Date    Abnormal heart rhythm     Acid reflux     Anxiety     Bell's palsy     C. difficile colitis     Chronic idiopathic constipation     COPD (chronic obstructive pulmonary disease)     Depression     Diverticulitis     Hyperlipidemia     Hypertension     IBS (irritable bowel syndrome)     Kidney stones     Myocardial infarction     Pneumothorax     Ruptured disc, cervical     Stroke     Umbilical hernia        Current Meds:  Current Outpatient Medications   Medication Sig Dispense Refill    Belsomra 10 MG tablet Take 10 mg by mouth Daily.      clonazePAM (KlonoPIN) 1 MG tablet Take 1 tablet by mouth 3 (Three) Times a Day As Needed for Seizures.      cyanocobalamin 1000 MCG/ML injection Inject 1 mL into the appropriate muscle as directed by prescriber Every 14 (Fourteen) Days.      DULoxetine (CYMBALTA) 30 MG capsule TAKE ONE CAPSULE BY MOUTH EVERY NIGHT AT BEDTIME FOR 1 WEEK, THEN INCREASE TO TWO CAPSULES EVERY NIGHT AT BEDTIME FOR MOOD      Eliquis 2.5 MG tablet tablet TAKE ONE TABLET BY MOUTH TWO TIMES A  DAY FOR CIRCULATION      gabapentin (NEURONTIN) 300 MG capsule TAKE THREE CAPSULES BY MOUTH THREE TIMES A DAY AS NEEDED FOR PERIPHERAL AND NEUROPATHIC PAIN      HYDROcodone-acetaminophen (LORTAB)  MG per tablet Take 1 tablet by mouth 4 (Four) Times a Day As Needed for Moderate Pain.      hydrOXYzine (VISTARIL) 50 MG capsule Take 1 capsule by mouth 3 (Three) Times a Day As Needed for Anxiety for up to 20 doses. 20 capsule 0    metoprolol tartrate (LOPRESSOR) 25 MG tablet Take 1 tablet by mouth Daily. 30 tablet 11    nitroglycerin (NITROSTAT) 0.4 MG SL tablet 1 under the tongue as needed for angina, may repeat q5mins for up three doses 25 tablet 0    ondansetron (ZOFRAN) 8 MG tablet Take 1 tablet by mouth Daily As Needed.      pantoprazole (PROTONIX) 40 MG EC tablet Take 1 tablet by mouth As Needed.      polyethylene glycol (MIRALAX) packet Take 17 g by mouth Daily.      rosuvastatin (CRESTOR) 10 MG tablet Take 1 tablet by mouth Daily. 30 tablet 11    sodium chloride (OCEAN) 0.65 % nasal spray Administer 1 spray into the nostril(s) as directed by provider As Needed for Congestion.      tamsulosin (FLOMAX) 0.4 MG capsule 24 hr capsule TAKE ONE CAPSULE BY MOUTH EVERY DAY FOR PROSTATE       No current facility-administered medications for this visit.        Allergies:   Allergies   Allergen Reactions    Pseudoephedrine      Very hyper        Past Surgical History:  Past Surgical History:   Procedure Laterality Date    CARDIAC CATHETERIZATION      CARDIAC CATHETERIZATION N/A 2/17/2017    Procedure: Left Heart Cath;  Surgeon: Horacio Bedoya MD;  Location:  [facility];  Service:     ENDOSCOPY N/A 11/7/2016    Procedure: ESOPHAGOGASTRODUODENOSCOPY;  Surgeon: Henry Smyth DO;  Location:  [facility];  Service:     FINGER SURGERY      NASAL SEPTUM SURGERY      UMBILICAL HERNIA REPAIR         Social History:  Social History     Socioeconomic History    Marital status:    Tobacco Use     Smoking status: Every Day     Current packs/day: 3.50     Types: Cigarettes     Passive exposure: Current    Smokeless tobacco: Never   Vaping Use    Vaping status: Never Used   Substance and Sexual Activity    Alcohol use: No    Drug use: No    Sexual activity: Defer       Family History:  Family History   Problem Relation Age of Onset    Dementia Mother     Emphysema Mother     Alcohol abuse Father     Cancer Father     Heart disease Father     Heart attack Father     Cancer Brother     Hypertension Brother     Breast cancer Sister     Heart attack Maternal Grandfather     Heart attack Paternal Grandfather     Heart attack Paternal Uncle        Review of Systems:  Review of Systems   Constitutional:  Negative for chills, fatigue, fever and unexpected weight change.   Respiratory:  Negative for cough, chest tightness, shortness of breath and wheezing.    Cardiovascular:  Negative for chest pain and leg swelling.   Gastrointestinal:  Negative for abdominal pain, constipation, diarrhea, nausea and vomiting.   Genitourinary:  Positive for difficulty urinating. Negative for dysuria, frequency, penile swelling, scrotal swelling, testicular pain and urgency.   Musculoskeletal:  Negative for back pain and joint swelling.   Skin:  Negative for rash.   Neurological:  Negative for dizziness and headaches.   Psychiatric/Behavioral:  Negative for confusion and suicidal ideas.        Physical Exam:  Physical Exam  Constitutional:       General: He is not in acute distress.     Appearance: Normal appearance.   HENT:      Head: Normocephalic and atraumatic.      Nose: Nose normal.      Mouth/Throat:      Mouth: Mucous membranes are moist.   Eyes:      Conjunctiva/sclera: Conjunctivae normal.   Cardiovascular:      Rate and Rhythm: Normal rate and regular rhythm.      Pulses: Normal pulses.      Heart sounds: Normal heart sounds.   Pulmonary:      Effort: Pulmonary effort is normal.      Breath sounds: Normal breath sounds.    Abdominal:      General: Bowel sounds are normal.      Palpations: Abdomen is soft.   Genitourinary:     Penis: Normal.       Testes: Normal.   Musculoskeletal:         General: Normal range of motion.      Cervical back: Normal range of motion.   Skin:     General: Skin is warm.   Neurological:      General: No focal deficit present.      Mental Status: He is alert and oriented to person, place, and time.   Psychiatric:         Mood and Affect: Mood normal.         Behavior: Behavior normal.         Thought Content: Thought content normal.         Judgment: Judgment normal.           Recent Image (CT and/or KUB):   CT Abdomen and Pelvis: No results found for this or any previous visit.     CT Stone Protocol: No results found for this or any previous visit.     KUB: No results found for this or any previous visit.       Labs:  Brief Urine Lab Results       None          No visits with results within 3 Month(s) from this visit.   Latest known visit with results is:   Lab Requisition on 07/21/2023   Component Date Value Ref Range Status    Glucose 07/21/2023 97  65 - 99 mg/dL Final    BUN 07/21/2023 10  8 - 23 mg/dL Final    Creatinine 07/21/2023 0.45 (L)  0.76 - 1.27 mg/dL Final    Sodium 07/21/2023 139  136 - 145 mmol/L Final    Potassium 07/21/2023 4.4  3.5 - 5.2 mmol/L Final    Chloride 07/21/2023 99  98 - 107 mmol/L Final    CO2 07/21/2023 28.0  22.0 - 29.0 mmol/L Final    Calcium 07/21/2023 10.0  8.6 - 10.5 mg/dL Final    Total Protein 07/21/2023 6.8  6.0 - 8.5 g/dL Final    Albumin 07/21/2023 3.7  3.5 - 5.2 g/dL Final    ALT (SGPT) 07/21/2023 27  1 - 41 U/L Final    AST (SGOT) 07/21/2023 19  1 - 40 U/L Final    Alkaline Phosphatase 07/21/2023 93  39 - 117 U/L Final    Total Bilirubin 07/21/2023 0.4  0.0 - 1.2 mg/dL Final    Globulin 07/21/2023 3.1  gm/dL Final    A/G Ratio 07/21/2023 1.2  g/dL Final    BUN/Creatinine Ratio 07/21/2023 22.2  7.0 - 25.0 Final    Anion Gap 07/21/2023 12.0  5.0 - 15.0 mmol/L Final     eGFR 07/21/2023 119.8  >60.0 mL/min/1.73 Final    Total Cholesterol 07/21/2023 185  0 - 200 mg/dL Final    Triglycerides 07/21/2023 150  0 - 150 mg/dL Final    HDL Cholesterol 07/21/2023 39 (L)  40 - 60 mg/dL Final    LDL Cholesterol  07/21/2023 119 (H)  0 - 100 mg/dL Final    VLDL Cholesterol 07/21/2023 27  5 - 40 mg/dL Final    LDL/HDL Ratio 07/21/2023 2.97   Final    Hemoglobin A1C 07/21/2023 5.40  4.80 - 5.60 % Final    Vitamin B-12 07/21/2023 631  211 - 946 pg/mL Final    WBC 07/21/2023 5.85  3.40 - 10.80 10*3/mm3 Final    RBC 07/21/2023 3.20 (L)  4.14 - 5.80 10*6/mm3 Final    Hemoglobin 07/21/2023 10.6 (L)  13.0 - 17.7 g/dL Final    Hematocrit 07/21/2023 34.1 (L)  37.5 - 51.0 % Final    MCV 07/21/2023 106.6 (H)  79.0 - 97.0 fL Final    MCH 07/21/2023 33.1 (H)  26.6 - 33.0 pg Final    MCHC 07/21/2023 31.1 (L)  31.5 - 35.7 g/dL Final    RDW 07/21/2023 13.9  12.3 - 15.4 % Final    RDW-SD 07/21/2023 55.1 (H)  37.0 - 54.0 fl Final    MPV 07/21/2023 10.8  6.0 - 12.0 fL Final    Platelets 07/21/2023 664 (H)  140 - 450 10*3/mm3 Final    Neutrophil % 07/21/2023 73.1  42.7 - 76.0 % Final    Lymphocyte % 07/21/2023 15.4 (L)  19.6 - 45.3 % Final    Monocyte % 07/21/2023 8.9  5.0 - 12.0 % Final    Eosinophil % 07/21/2023 1.5  0.3 - 6.2 % Final    Basophil % 07/21/2023 0.9  0.0 - 1.5 % Final    Immature Grans % 07/21/2023 0.2  0.0 - 0.5 % Final    Neutrophils, Absolute 07/21/2023 4.28  1.70 - 7.00 10*3/mm3 Final    Lymphocytes, Absolute 07/21/2023 0.90  0.70 - 3.10 10*3/mm3 Final    Monocytes, Absolute 07/21/2023 0.52  0.10 - 0.90 10*3/mm3 Final    Eosinophils, Absolute 07/21/2023 0.09  0.00 - 0.40 10*3/mm3 Final    Basophils, Absolute 07/21/2023 0.05  0.00 - 0.20 10*3/mm3 Final    Immature Grans, Absolute 07/21/2023 0.01  0.00 - 0.05 10*3/mm3 Final    nRBC 07/21/2023 0.0  0.0 - 0.2 /100 WBC Final        Procedure:  16 [place]ian straight cath Change, Simple    Date/Time: 10/14/2024 3:07 PM    Performed by: Bacilio Bardales  "JAM  Authorized by: Bacilio Bardales PA-C  Consent: Verbal consent obtained.  Patient understanding: patient states understanding of the procedure being performed  Patient consent: the patient's understanding of the procedure matches consent given  Procedure consent: procedure consent matches procedure scheduled  Relevant documents: relevant documents present and verified  Test results: test results available and properly labeled  Site marked: the operative site was marked  Patient identity confirmed: verbally with patient  Time out: Immediately prior to procedure a \"time out\" was called to verify the correct patient, procedure, equipment, support staff and site/side marked as required.  Preparation: Patient was prepped and draped in the usual sterile fashion.  Local anesthesia used: yes    Anesthesia:  Local anesthesia used: yes    Sedation:  Patient sedated: no    Patient tolerance: patient tolerated the procedure well with no immediate complications           Assessment/Plan:   Problem List Items Addressed This Visit       Urinary catheter (Mercado) change required - Primary       Health Maintenance:   Health Maintenance Due   Topic Date Due    URINE MICROALBUMIN  Never done    COLORECTAL CANCER SCREENING  Never done    Pneumococcal Vaccine 0-64 (1 of 2 - PCV) Never done    TDAP/TD VACCINES (1 - Tdap) Never done    ZOSTER VACCINE (1 of 2) Never done    HEPATITIS C SCREENING  Never done    DIABETIC FOOT EXAM  Never done    DIABETIC EYE EXAM  10/31/2017    HEMOGLOBIN A1C  05/01/2024    LIPID PANEL  07/21/2024    INFLUENZA VACCINE  Never done    COVID-19 Vaccine (3 - 2023-24 season) 09/01/2024    ANNUAL WELLNESS VISIT  12/26/2024        Smoking Counseling: Everyday smoker.  Never used smokeless tobacco.  Counseling given    Urine Incontinence: Patient reports that he is not currently experiencing any symptoms of urinary incontinence.    Patient was given instructions and counseling regarding his condition or for " health maintenance advice. Please see specific information pulled into the AVS if appropriate.    Patient Education:   Mercado catheter exchange -I did exchange the patient's indwelling Mercado catheter with 16 Mont[place]gr[place] straight.  He tolerated procedure well with no acute complications.  Did recommend return to the clinic sooner if needed otherwise we will see him back in a month for repeat cath exchange    Visit Diagnoses:    ICD-10-CM ICD-9-CM   1. Urinary catheter (Mercado) change required  Z46.6 V53.6       Meds Ordered During Visit:  No orders of the defined types were placed in this encounter.      Follow Up Appointment: 1 month  No follow-ups on file.      This document has been electronically signed by Bacilio Bardales PA-C   October 14, 2024 15:08 EDT    Part of this note may be an electronic transcription/translation of spoken language to printed text using the Dragon Dictation System.

## 2024-11-11 ENCOUNTER — OFFICE VISIT (OUTPATIENT)
Dept: UROLOGY | Facility: CLINIC | Age: 64
End: 2024-11-11
Payer: MEDICARE

## 2024-11-11 VITALS — HEIGHT: 76 IN | BODY MASS INDEX: 19.3 KG/M2

## 2024-11-11 DIAGNOSIS — Z46.6 URINARY CATHETER (FOLEY) CHANGE REQUIRED: ICD-10-CM

## 2024-11-11 DIAGNOSIS — R97.20 ELEVATED PROSTATE SPECIFIC ANTIGEN (PSA): Primary | ICD-10-CM

## 2024-11-11 PROCEDURE — 1160F RVW MEDS BY RX/DR IN RCRD: CPT

## 2024-11-11 PROCEDURE — 51702 INSERT TEMP BLADDER CATH: CPT

## 2024-11-11 PROCEDURE — 1159F MED LIST DOCD IN RCRD: CPT

## 2024-11-11 NOTE — PROGRESS NOTES
"Chief Complaint:    Chief Complaint   Patient presents with    Urinary Catheter Mercado Cath Change      Cath Change        Vital Signs:   Ht 193 cm (75.98\")   BMI 19.30 kg/m²   Body mass index is 19.3 kg/m².      HPI:  Kamron Johns is a 64 y.o. male who presents today for follow up    History of Present Illness  Mr. Johns presents to the clinic for monthly follow-up for indwelling Mercado catheter care.  He has been seen previously and has a history of neurogenic bladder and undergoes repeat cath changes in office.  He was last seen a month ago and had no significant complications.  Currently has a 16 French indwelling Mercado catheter and I did exchange this in office today.  Patient does endorse that his primary care provider had done a PSA on November 4 that came back elevated at a little over 5.  He is unsure if he is ever had a PSA prior to this.  Denies any family history of prostate cancer.      Past Medical History:  Past Medical History:   Diagnosis Date    Abnormal heart rhythm     Acid reflux     Anxiety     Bell's palsy     C. difficile colitis     Chronic idiopathic constipation     COPD (chronic obstructive pulmonary disease)     Depression     Diverticulitis     Hyperlipidemia     Hypertension     IBS (irritable bowel syndrome)     Kidney stones     Myocardial infarction     Pneumothorax     Ruptured disc, cervical     Stroke     Umbilical hernia        Current Meds:  Current Outpatient Medications   Medication Sig Dispense Refill    Belsomra 10 MG tablet Take 10 mg by mouth Daily.      clonazePAM (KlonoPIN) 1 MG tablet Take 1 tablet by mouth 3 (Three) Times a Day As Needed for Seizures.      cyanocobalamin 1000 MCG/ML injection Inject 1 mL into the appropriate muscle as directed by prescriber Every 14 (Fourteen) Days.      DULoxetine (CYMBALTA) 30 MG capsule TAKE ONE CAPSULE BY MOUTH EVERY NIGHT AT BEDTIME FOR 1 WEEK, THEN INCREASE TO TWO CAPSULES EVERY NIGHT AT BEDTIME FOR MOOD      Eliquis 2.5 MG " tablet tablet TAKE ONE TABLET BY MOUTH TWO TIMES A DAY FOR CIRCULATION      gabapentin (NEURONTIN) 300 MG capsule TAKE THREE CAPSULES BY MOUTH THREE TIMES A DAY AS NEEDED FOR PERIPHERAL AND NEUROPATHIC PAIN      HYDROcodone-acetaminophen (LORTAB)  MG per tablet Take 1 tablet by mouth 4 (Four) Times a Day As Needed for Moderate Pain.      hydrOXYzine (VISTARIL) 50 MG capsule Take 1 capsule by mouth 3 (Three) Times a Day As Needed for Anxiety for up to 20 doses. 20 capsule 0    metoprolol tartrate (LOPRESSOR) 25 MG tablet Take 1 tablet by mouth Daily. 30 tablet 11    nitroglycerin (NITROSTAT) 0.4 MG SL tablet 1 under the tongue as needed for angina, may repeat q5mins for up three doses 25 tablet 0    ondansetron (ZOFRAN) 8 MG tablet Take 1 tablet by mouth Daily As Needed.      pantoprazole (PROTONIX) 40 MG EC tablet Take 1 tablet by mouth As Needed.      polyethylene glycol (MIRALAX) packet Take 17 g by mouth Daily.      rosuvastatin (CRESTOR) 10 MG tablet Take 1 tablet by mouth Daily. 30 tablet 11    sodium chloride (OCEAN) 0.65 % nasal spray Administer 1 spray into the nostril(s) as directed by provider As Needed for Congestion.      tamsulosin (FLOMAX) 0.4 MG capsule 24 hr capsule TAKE ONE CAPSULE BY MOUTH EVERY DAY FOR PROSTATE       No current facility-administered medications for this visit.        Allergies:   Allergies   Allergen Reactions    Pseudoephedrine      Very hyper        Past Surgical History:  Past Surgical History:   Procedure Laterality Date    CARDIAC CATHETERIZATION      CARDIAC CATHETERIZATION N/A 2/17/2017    Procedure: Left Heart Cath;  Surgeon: Horacio Bedoya MD;  Location: [facility] CATH INVASIVE LOCATION;  Service:     ENDOSCOPY N/A 11/7/2016    Procedure: ESOPHAGOGASTRODUODENOSCOPY;  Surgeon: Henry Smyth DO;  Location: [facility] ENDOSCOPY;  Service:     FINGER SURGERY      NASAL SEPTUM SURGERY      UMBILICAL HERNIA REPAIR         Social History:  Social History      Socioeconomic History    Marital status:    Tobacco Use    Smoking status: Every Day     Current packs/day: 3.50     Types: Cigarettes     Passive exposure: Current    Smokeless tobacco: Never   Vaping Use    Vaping status: Never Used   Substance and Sexual Activity    Alcohol use: No    Drug use: No    Sexual activity: Defer       Family History:  Family History   Problem Relation Age of Onset    Dementia Mother     Emphysema Mother     Alcohol abuse Father     Cancer Father     Heart disease Father     Heart attack Father     Cancer Brother     Hypertension Brother     Breast cancer Sister     Heart attack Maternal Grandfather     Heart attack Paternal Grandfather     Heart attack Paternal Uncle        Review of Systems:  Review of Systems   Constitutional:  Positive for fatigue. Negative for chills, fever and unexpected weight change.   HENT:  Negative for congestion and sinus pressure.    Respiratory:  Negative for chest tightness and shortness of breath.    Cardiovascular:  Negative for chest pain.   Gastrointestinal:  Negative for abdominal pain, constipation, diarrhea, nausea and vomiting.   Genitourinary:  Positive for difficulty urinating. Negative for dysuria, flank pain, frequency, hematuria and urgency.   Musculoskeletal:  Negative for back pain and neck pain.   Skin:  Negative for rash.   Neurological:  Negative for dizziness and headaches.   Hematological:  Bruises/bleeds easily.   Psychiatric/Behavioral:  Negative for confusion and suicidal ideas. The patient is not nervous/anxious.        Physical Exam:  Physical Exam  Constitutional:       General: He is not in acute distress.     Appearance: Normal appearance.   HENT:      Head: Normocephalic and atraumatic.      Nose: Nose normal.      Mouth/Throat:      Mouth: Mucous membranes are moist.   Eyes:      Conjunctiva/sclera: Conjunctivae normal.   Cardiovascular:      Rate and Rhythm: Normal rate and regular rhythm.      Pulses: Normal  pulses.      Heart sounds: Normal heart sounds.   Pulmonary:      Effort: Pulmonary effort is normal.      Breath sounds: Normal breath sounds.   Abdominal:      General: Bowel sounds are normal.      Palpations: Abdomen is soft.   Genitourinary:     Penis: Normal.       Testes: Normal.   Musculoskeletal:         General: Normal range of motion.      Cervical back: Normal range of motion.   Skin:     General: Skin is warm.   Neurological:      General: No focal deficit present.      Mental Status: He is alert and oriented to person, place, and time.   Psychiatric:         Mood and Affect: Mood normal.         Behavior: Behavior normal.         Thought Content: Thought content normal.         Judgment: Judgment normal.       Recent Image (CT and/or KUB):   CT Abdomen and Pelvis: No results found for this or any previous visit.     CT Stone Protocol: No results found for this or any previous visit.     KUB: No results found for this or any previous visit.       Labs:  Brief Urine Lab Results       None          No visits with results within 3 Month(s) from this visit.   Latest known visit with results is:   Lab Requisition on 07/21/2023   Component Date Value Ref Range Status    Glucose 07/21/2023 97  65 - 99 mg/dL Final    BUN 07/21/2023 10  8 - 23 mg/dL Final    Creatinine 07/21/2023 0.45 (L)  0.76 - 1.27 mg/dL Final    Sodium 07/21/2023 139  136 - 145 mmol/L Final    Potassium 07/21/2023 4.4  3.5 - 5.2 mmol/L Final    Chloride 07/21/2023 99  98 - 107 mmol/L Final    CO2 07/21/2023 28.0  22.0 - 29.0 mmol/L Final    Calcium 07/21/2023 10.0  8.6 - 10.5 mg/dL Final    Total Protein 07/21/2023 6.8  6.0 - 8.5 g/dL Final    Albumin 07/21/2023 3.7  3.5 - 5.2 g/dL Final    ALT (SGPT) 07/21/2023 27  1 - 41 U/L Final    AST (SGOT) 07/21/2023 19  1 - 40 U/L Final    Alkaline Phosphatase 07/21/2023 93  39 - 117 U/L Final    Total Bilirubin 07/21/2023 0.4  0.0 - 1.2 mg/dL Final    Globulin 07/21/2023 3.1  gm/dL Final    A/G  Ratio 07/21/2023 1.2  g/dL Final    BUN/Creatinine Ratio 07/21/2023 22.2  7.0 - 25.0 Final    Anion Gap 07/21/2023 12.0  5.0 - 15.0 mmol/L Final    eGFR 07/21/2023 119.8  >60.0 mL/min/1.73 Final    Total Cholesterol 07/21/2023 185  0 - 200 mg/dL Final    Triglycerides 07/21/2023 150  0 - 150 mg/dL Final    HDL Cholesterol 07/21/2023 39 (L)  40 - 60 mg/dL Final    LDL Cholesterol  07/21/2023 119 (H)  0 - 100 mg/dL Final    VLDL Cholesterol 07/21/2023 27  5 - 40 mg/dL Final    LDL/HDL Ratio 07/21/2023 2.97   Final    Hemoglobin A1C 07/21/2023 5.40  4.80 - 5.60 % Final    Vitamin B-12 07/21/2023 631  211 - 946 pg/mL Final    WBC 07/21/2023 5.85  3.40 - 10.80 10*3/mm3 Final    RBC 07/21/2023 3.20 (L)  4.14 - 5.80 10*6/mm3 Final    Hemoglobin 07/21/2023 10.6 (L)  13.0 - 17.7 g/dL Final    Hematocrit 07/21/2023 34.1 (L)  37.5 - 51.0 % Final    MCV 07/21/2023 106.6 (H)  79.0 - 97.0 fL Final    MCH 07/21/2023 33.1 (H)  26.6 - 33.0 pg Final    MCHC 07/21/2023 31.1 (L)  31.5 - 35.7 g/dL Final    RDW 07/21/2023 13.9  12.3 - 15.4 % Final    RDW-SD 07/21/2023 55.1 (H)  37.0 - 54.0 fl Final    MPV 07/21/2023 10.8  6.0 - 12.0 fL Final    Platelets 07/21/2023 664 (H)  140 - 450 10*3/mm3 Final    Neutrophil % 07/21/2023 73.1  42.7 - 76.0 % Final    Lymphocyte % 07/21/2023 15.4 (L)  19.6 - 45.3 % Final    Monocyte % 07/21/2023 8.9  5.0 - 12.0 % Final    Eosinophil % 07/21/2023 1.5  0.3 - 6.2 % Final    Basophil % 07/21/2023 0.9  0.0 - 1.5 % Final    Immature Grans % 07/21/2023 0.2  0.0 - 0.5 % Final    Neutrophils, Absolute 07/21/2023 4.28  1.70 - 7.00 10*3/mm3 Final    Lymphocytes, Absolute 07/21/2023 0.90  0.70 - 3.10 10*3/mm3 Final    Monocytes, Absolute 07/21/2023 0.52  0.10 - 0.90 10*3/mm3 Final    Eosinophils, Absolute 07/21/2023 0.09  0.00 - 0.40 10*3/mm3 Final    Basophils, Absolute 07/21/2023 0.05  0.00 - 0.20 10*3/mm3 Final    Immature Grans, Absolute 07/21/2023 0.01  0.00 - 0.05 10*3/mm3 Final    nRBC 07/21/2023 0.0  0.0 -  "0.2 /100 WBC Final        Procedure:  Cath Change, Simple    Date/Time: 11/11/2024 2:59 PM    Performed by: Bacilio Bardales PA-C  Authorized by: Bacilio Bardales PA-C  Consent: Verbal consent obtained.  Risks and benefits: risks, benefits and alternatives were discussed  Consent given by: patient  Patient understanding: patient states understanding of the procedure being performed  Patient consent: the patient's understanding of the procedure matches consent given  Procedure consent: procedure consent matches procedure scheduled  Relevant documents: relevant documents present and verified  Test results: test results available and properly labeled  Site marked: the operative site was marked  Imaging studies: imaging studies available  Patient identity confirmed: verbally with patient  Time out: Immediately prior to procedure a \"time out\" was called to verify the correct patient, procedure, equipment, support staff and site/side marked as required.  Preparation: Patient was prepped and draped in the usual sterile fashion.  Local anesthesia used: no    Anesthesia:  Local anesthesia used: no    Sedation:  Patient sedated: no    Patient tolerance: patient tolerated the procedure well with no immediate complications           Assessment/Plan:   Problem List Items Addressed This Visit       Urinary catheter (Mercado) change required    Elevated prostate specific antigen (PSA) - Primary       Health Maintenance:   Health Maintenance Due   Topic Date Due    URINE MICROALBUMIN  Never done    COLORECTAL CANCER SCREENING  Never done    Pneumococcal Vaccine 0-64 (1 of 2 - PCV) Never done    TDAP/TD VACCINES (1 - Tdap) Never done    ZOSTER VACCINE (1 of 2) Never done    HEPATITIS C SCREENING  Never done    DIABETIC FOOT EXAM  Never done    DIABETIC EYE EXAM  10/31/2017    INFLUENZA VACCINE  Never done    COVID-19 Vaccine (3 - 2024-25 season) 09/01/2024    ANNUAL WELLNESS VISIT  12/26/2024        Smoking Counseling: Everyday " smoker.  Never used smokeless tobacco.  Counseling given.    Urine Incontinence: Patient reports that he is not currently experiencing any symptoms of urinary incontinence.    Patient was given instructions and counseling regarding his condition or for health maintenance advice. Please see specific information pulled into the AVS if appropriate.    Patient Education:   Mercado catheter exchange -patient presents for monthly catheter care.  I did exchange the patient's 16 Cook Islander coudé catheter in office today and he tolerated procedure well with no acute complications.  Yellow urine was noted in return.  Will have him follow-up in 1 month for repeat cath exchange  Elevated PSA- I am recommending repeat PSA test at next office visit.  I discussed the pathophysiology of PSA testing indicating its use in the diagnosis and management of prostate cancer.  I discussed the normal range being 0 to 4, but more appropriately being much closer to 0 to 2 in a normal male.  I discussed the fact that after a certain age it is against recommendation to use PSA testing especially in view of numerous comorbidities.  I discussed many of the things that can artificially raise PSA including put not limited to a recent infection, urinary tract infection, recent sexual intercourse, or even the type of movement such as manipulation of the prostate from riding a bicycle.  It was discussed that the most important use of PSA is the velocity measurement.  This refers to the change of PSA with time. I discussed that we look for greater than 20% rise over a year to help us make the prediction of prostate cancer.  I also discussed that in the case of prostate cancer indicating a radical prostatectomy, the PSA should be 0 and any rise indicates an early biochemical recurrence.  Did advise patient his PSA may be elevated from chronic indwelling Mercado catheter as well as inflammation/UTI.  Will repeat at next office visit and possibly proceed forward  with an MRI if warranted.    Visit Diagnoses:    ICD-10-CM ICD-9-CM   1. Elevated prostate specific antigen (PSA)  R97.20 790.93   2. Urinary catheter (Mercado) change required  Z46.6 V53.6       Meds Ordered During Visit:  No orders of the defined types were placed in this encounter.      Follow Up Appointment: 1 month  No follow-ups on file.      This document has been electronically signed by Bacilio Bardales PA-C   November 11, 2024 15:01 EST    Part of this note may be an electronic transcription/translation of spoken language to printed text using the Dragon Dictation System.

## 2024-11-15 ENCOUNTER — TELEPHONE (OUTPATIENT)
Dept: UROLOGY | Facility: CLINIC | Age: 64
End: 2024-11-15

## 2024-11-15 NOTE — TELEPHONE ENCOUNTER
Caller: BROCK MACKAY    Relationship: SISTER     Best call back number: [phone]    PATIENT CALLED REQUESTING TO CANCEL SAME DAY APPT.    Did the patient call AFTER the start time of their scheduled appointment?  []YES  [x]NO    Was the patient's appointment rescheduled? []YES  [x]NO    Any additional information: CATHETER HAS BEEN FIXED.

## 2024-12-08 ENCOUNTER — HOSPITAL ENCOUNTER (EMERGENCY)
Facility: HOSPITAL | Age: 64
Discharge: HOME | End: 2024-12-08
Attending: EMERGENCY MEDICINE | Admitting: EMERGENCY MEDICINE
Payer: MEDICARE

## 2024-12-08 VITALS
WEIGHT: 160 LBS | TEMPERATURE: 98.5 F | RESPIRATION RATE: 14 BRPM | DIASTOLIC BLOOD PRESSURE: 77 MMHG | SYSTOLIC BLOOD PRESSURE: 145 MMHG | OXYGEN SATURATION: 94 % | BODY MASS INDEX: 19.48 KG/M2 | HEART RATE: 68 BPM | HEIGHT: 76 IN

## 2024-12-08 DIAGNOSIS — T14.8XXD WOUND HEALING, DELAYED: Primary | ICD-10-CM

## 2024-12-08 LAB
ALBUMIN SERPL-MCNC: 3.7 G/DL (ref 3.5–5.2)
ALBUMIN/GLOB SERPL: 1.2 G/DL
ALP SERPL-CCNC: 118 U/L (ref 39–117)
ALT SERPL W P-5'-P-CCNC: 5 U/L (ref 1–41)
ANION GAP SERPL CALCULATED.3IONS-SCNC: 9.1 MMOL/L (ref 5–15)
AST SERPL-CCNC: 10 U/L (ref 1–40)
BASOPHILS # BLD AUTO: 0.06 10*3/MM3 (ref 0–0.2)
BASOPHILS NFR BLD AUTO: 1.1 % (ref 0–1.5)
BILIRUB SERPL-MCNC: 0.3 MG/DL (ref 0–1.2)
BUN SERPL-MCNC: 5 MG/DL (ref 8–23)
BUN/CREAT SERPL: 6.8 (ref 7–25)
CALCIUM SPEC-SCNC: 9.3 MG/DL (ref 8.6–10.5)
CHLORIDE SERPL-SCNC: 95 MMOL/L (ref 98–107)
CO2 SERPL-SCNC: 30.9 MMOL/L (ref 22–29)
CREAT SERPL-MCNC: 0.73 MG/DL (ref 0.76–1.27)
DEPRECATED RDW RBC AUTO: 48.3 FL (ref 37–54)
EGFRCR SERPLBLD CKD-EPI 2021: 102.9 ML/MIN/1.73
EOSINOPHIL # BLD AUTO: 0.14 10*3/MM3 (ref 0–0.4)
EOSINOPHIL NFR BLD AUTO: 2.6 % (ref 0.3–6.2)
ERYTHROCYTE [DISTWIDTH] IN BLOOD BY AUTOMATED COUNT: 13.5 % (ref 12.3–15.4)
GLOBULIN UR ELPH-MCNC: 3.2 GM/DL
GLUCOSE SERPL-MCNC: 100 MG/DL (ref 65–99)
HCT VFR BLD AUTO: 41 % (ref 37.5–51)
HGB BLD-MCNC: 12.9 G/DL (ref 13–17.7)
HOLD SPECIMEN: NORMAL
HOLD SPECIMEN: NORMAL
IMM GRANULOCYTES # BLD AUTO: 0.02 10*3/MM3 (ref 0–0.05)
IMM GRANULOCYTES NFR BLD AUTO: 0.4 % (ref 0–0.5)
LYMPHOCYTES # BLD AUTO: 1.55 10*3/MM3 (ref 0.7–3.1)
LYMPHOCYTES NFR BLD AUTO: 28.9 % (ref 19.6–45.3)
MCH RBC QN AUTO: 30.5 PG (ref 26.6–33)
MCHC RBC AUTO-ENTMCNC: 31.5 G/DL (ref 31.5–35.7)
MCV RBC AUTO: 96.9 FL (ref 79–97)
MONOCYTES # BLD AUTO: 0.45 10*3/MM3 (ref 0.1–0.9)
MONOCYTES NFR BLD AUTO: 8.4 % (ref 5–12)
NEUTROPHILS NFR BLD AUTO: 3.15 10*3/MM3 (ref 1.7–7)
NEUTROPHILS NFR BLD AUTO: 58.6 % (ref 42.7–76)
NRBC BLD AUTO-RTO: 0 /100 WBC (ref 0–0.2)
PLATELET # BLD AUTO: 372 10*3/MM3 (ref 140–450)
PMV BLD AUTO: 9.5 FL (ref 6–12)
POTASSIUM SERPL-SCNC: 4.2 MMOL/L (ref 3.5–5.2)
PROT SERPL-MCNC: 6.9 G/DL (ref 6–8.5)
RBC # BLD AUTO: 4.23 10*6/MM3 (ref 4.14–5.8)
SODIUM SERPL-SCNC: 135 MMOL/L (ref 136–145)
WBC NRBC COR # BLD AUTO: 5.37 10*3/MM3 (ref 3.4–10.8)
WHOLE BLOOD HOLD COAG: NORMAL
WHOLE BLOOD HOLD SPECIMEN: NORMAL

## 2024-12-08 PROCEDURE — 99283 EMERGENCY DEPT VISIT LOW MDM: CPT

## 2024-12-08 PROCEDURE — 80053 COMPREHEN METABOLIC PANEL: CPT | Performed by: PHYSICIAN ASSISTANT

## 2024-12-08 PROCEDURE — 85025 COMPLETE CBC W/AUTO DIFF WBC: CPT | Performed by: PHYSICIAN ASSISTANT

## 2024-12-08 RX ORDER — MUPIROCIN 20 MG/G
1 OINTMENT TOPICAL ONCE
Status: COMPLETED | OUTPATIENT
Start: 2024-12-08 | End: 2024-12-08

## 2024-12-08 RX ORDER — DOXYCYCLINE 100 MG/1
100 CAPSULE ORAL 2 TIMES DAILY
Qty: 20 CAPSULE | Refills: 0 | Status: SHIPPED | OUTPATIENT
Start: 2024-12-08

## 2024-12-08 RX ORDER — MUPIROCIN 20 MG/G
1 OINTMENT TOPICAL DAILY
Qty: 30 G | Refills: 0 | Status: SHIPPED | OUTPATIENT
Start: 2024-12-08

## 2024-12-08 RX ORDER — DOXYCYCLINE 100 MG/1
100 CAPSULE ORAL ONCE
Status: COMPLETED | OUTPATIENT
Start: 2024-12-08 | End: 2024-12-08

## 2024-12-08 RX ADMIN — MUPIROCIN 1 APPLICATION: 20 OINTMENT TOPICAL at 17:16

## 2024-12-08 RX ADMIN — DOXYCYCLINE 100 MG: 100 CAPSULE ORAL at 17:16

## 2024-12-08 NOTE — ED PROVIDER NOTES
Subjective   History of Present Illness  64-year-old male presents secondary to right forearm wound.  Patient states that he believes this started from a brown recluse bite 3 to 4 weeks ago.  He states that 1 time he did have an area of redness and swelling.  He states this did drain some pus.  He has been left with a chronic wound over the past couple of weeks.  He denies any fever.  He has a past medical history of stroke, MI, hypertension, hyperlipidemia, COPD, progressive neuropathy question Guillain-Barré.  Patient presents by private vehicle.      Review of Systems   Constitutional: Negative.  Negative for fever.   HENT: Negative.     Respiratory: Negative.     Cardiovascular: Negative.  Negative for chest pain.   Gastrointestinal: Negative.  Negative for abdominal pain.   Endocrine: Negative.    Genitourinary: Negative.  Negative for dysuria.   Skin: Negative.    Neurological: Negative.    Psychiatric/Behavioral: Negative.     All other systems reviewed and are negative.      Past Medical History:   Diagnosis Date    Abnormal heart rhythm     Acid reflux     Anxiety     Bell's palsy     C. difficile colitis     Chronic idiopathic constipation     COPD (chronic obstructive pulmonary disease)     Depression     Diverticulitis     Hyperlipidemia     Hypertension     IBS (irritable bowel syndrome)     Kidney stones     Myocardial infarction     Pneumothorax     Ruptured disc, cervical     Stroke     Umbilical hernia        Allergies   Allergen Reactions    Pseudoephedrine      Very hyper       Past Surgical History:   Procedure Laterality Date    CARDIAC CATHETERIZATION      CARDIAC CATHETERIZATION N/A 2/17/2017    Procedure: Left Heart Cath;  Surgeon: Horacio Bedoya MD;  Location:  [facility] CATH INVASIVE LOCATION;  Service:     ENDOSCOPY N/A 11/7/2016    Procedure: ESOPHAGOGASTRODUODENOSCOPY;  Surgeon: Henry Smyth DO;  Location:  [facility] ENDOSCOPY;  Service:     FINGER SURGERY      NASAL SEPTUM SURGERY       UMBILICAL HERNIA REPAIR         Family History   Problem Relation Age of Onset    Dementia Mother     Emphysema Mother     Alcohol abuse Father     Cancer Father     Heart disease Father     Heart attack Father     Cancer Brother     Hypertension Brother     Breast cancer Sister     Heart attack Maternal Grandfather     Heart attack Paternal Grandfather     Heart attack Paternal Uncle        Social History     Socioeconomic History    Marital status:    Tobacco Use    Smoking status: Every Day     Current packs/day: 3.50     Types: Cigarettes     Passive exposure: Current    Smokeless tobacco: Never   Vaping Use    Vaping status: Never Used   Substance and Sexual Activity    Alcohol use: No    Drug use: No    Sexual activity: Defer           Objective   Physical Exam  Vitals and nursing note reviewed.   Constitutional:       General: He is not in acute distress.     Appearance: He is well-developed. He is not diaphoretic.   HENT:      Head: Normocephalic and atraumatic.      Right Ear: External ear normal.      Left Ear: External ear normal.      Nose: Nose normal.   Eyes:      Conjunctiva/sclera: Conjunctivae normal.      Pupils: Pupils are equal, round, and reactive to light.   Neck:      Vascular: No JVD.      Trachea: No tracheal deviation.   Cardiovascular:      Rate and Rhythm: Normal rate and regular rhythm.      Heart sounds: Normal heart sounds. No murmur heard.  Pulmonary:      Effort: Pulmonary effort is normal. No respiratory distress.      Breath sounds: Normal breath sounds. No wheezing.   Abdominal:      General: Bowel sounds are normal.      Palpations: Abdomen is soft.      Tenderness: There is no abdominal tenderness.   Musculoskeletal:         General: No deformity. Normal range of motion.      Cervical back: Normal range of motion and neck supple.   Skin:     General: Skin is warm and dry.      Coloration: Skin is not pale.      Findings: No erythema or rash.      Comments: Patient has  an approximate 4 x 3 cm area of scabbing.  There is minimal surrounding redness.  There is no abscess or sign of deep space infection.  No streaking.   Neurological:      Mental Status: He is alert and oriented to person, place, and time.      Cranial Nerves: No cranial nerve deficit.   Psychiatric:         Behavior: Behavior normal.         Thought Content: Thought content normal.         Procedures           ED Course                                                       Medical Decision Making  64-year-old male presents secondary to right forearm wound.  Patient states that he believes this started from a brown recluse bite 3 to 4 weeks ago.  He states that 1 time he did have an area of redness and swelling.  He states this did drain some pus.  He has been left with a chronic wound over the past couple of weeks.  He denies any fever.  He has a past medical history of stroke, MI, hypertension, hyperlipidemia, COPD, progressive neuropathy question Guillain-Barré.  Patient presents by private vehicle.    Problems Addressed:  Wound healing, delayed: complicated acute illness or injury    Amount and/or Complexity of Data Reviewed  Labs: ordered. Decision-making details documented in ED Course.    Risk  Prescription drug management.  Risk Details: Patient was counseled out his labs.  He was counseled about the need for follow-up wound care clinic since this has been persistent and delayed.  He is counseled about moist wound healing.  He had previously used Bactroban however his wound is very dry at this time.  Will restart Bactroban ointment along with occlusive dressing.  He was counseled out the signs and symptoms worsen with appropriate follow-up and he voices understanding.        Final diagnoses:   Wound healing, delayed       ED Disposition  ED Disposition       ED Disposition   Discharge    Condition   Stable    Comment   --               Cas Estrada MD  [address] [place]  [address]  [phone]    Schedule an appointment as soon as possible for a visit       [facility] [address] [address]  [phone]  Schedule an appointment as soon as possible for a visit            Medication List        New Prescriptions      doxycycline 100 MG capsule  Commonly known as: MONODOX  Take 1 capsule by mouth 2 (Two) Times a Day.     mupirocin 2 % ointment  Commonly known as: BACTROBAN  Apply 1 Application topically to the appropriate area as directed Daily.               Where to Get Your Medications        You can get these medications from any pharmacy    Bring a paper prescription for each of these medications  doxycycline 100 MG capsule  mupirocin 2 % ointment            Constantino Rodriguez PA  12/08/24 1729       Constantino Rodriguez PA  12/08/24 1738

## 2024-12-09 ENCOUNTER — TELEPHONE (OUTPATIENT)
Dept: UROLOGY | Facility: CLINIC | Age: 64
End: 2024-12-09

## 2024-12-09 NOTE — TELEPHONE ENCOUNTER
Caller: BROCK    Relationship: SPOUSE      Best call back number: [phone]    PATIENT CALLED REQUESTING TO CANCEL SAME DAY APPT.    Did the patient call AFTER the start time of their scheduled appointment?  []YES  [x]NO    Was the patient's appointment rescheduled? [x]YES  []NO    Any additional information: APPT RESCHEDULED TO NEXT AVAILABLE WEDNESDAY PER CALLER.

## 2024-12-18 ENCOUNTER — OFFICE VISIT (OUTPATIENT)
Dept: UROLOGY | Facility: CLINIC | Age: 64
End: 2024-12-18
Payer: MEDICARE

## 2024-12-18 DIAGNOSIS — R97.20 ELEVATED PROSTATE SPECIFIC ANTIGEN (PSA): Primary | ICD-10-CM

## 2024-12-18 DIAGNOSIS — N31.9 NEUROGENIC BLADDER: ICD-10-CM

## 2024-12-18 NOTE — PROGRESS NOTES
Chief Complaint:    Chief Complaint   Patient presents with    Urinary catheter (Mercado) change required       Vital Signs:   There were no vitals taken for this visit.  There is no height or weight on file to calculate BMI.      HPI:  Kamron Johns is a 64 y.o. male who presents today for follow up    History of Present Illness  Mr. Johns presents to the clinic for monthly follow-up for indwelling Mercado catheter care.  He has been seen previously and has a history of neurogenic bladder and undergoes repeat cath changes in office.  He was last seen a month ago and had no significant complications.  Currently has a 18 French French indwelling Mercado catheter and I did exchange this in office today.  Patient does endorse that his primary care provider had done a PSA on November 4 that came back elevated at a little over 5.  He is unsure if he is ever had a PSA prior to this.  Denies any family history of prostate cancer.  He was recently seen in the emergency department due to a brown recluse bite and is currently on doxycycline.  He was also diagnosed with a UTI at that time.  He does wish to recheck his PSA in office today.  I did change out his 18 French coudé catheter with no complications      Past Medical History:  Past Medical History:   Diagnosis Date    Abnormal heart rhythm     Acid reflux     Anxiety     Bell's palsy     C. difficile colitis     Chronic idiopathic constipation     COPD (chronic obstructive pulmonary disease)     Depression     Diverticulitis     Hyperlipidemia     Hypertension     IBS (irritable bowel syndrome)     Kidney stones     Myocardial infarction     Pneumothorax     Ruptured disc, cervical     Stroke     Umbilical hernia        Current Meds:  Current Outpatient Medications   Medication Sig Dispense Refill    Belsomra 10 MG tablet Take 10 mg by mouth Daily.      clonazePAM (KlonoPIN) 1 MG tablet Take 1 tablet by mouth 3 (Three) Times a Day As Needed for Seizures.       cyanocobalamin 1000 MCG/ML injection Inject 1 mL into the appropriate muscle as directed by prescriber Every 14 (Fourteen) Days.      doxycycline (MONODOX) 100 MG capsule Take 1 capsule by mouth 2 (Two) Times a Day. 20 capsule 0    DULoxetine (CYMBALTA) 30 MG capsule TAKE ONE CAPSULE BY MOUTH EVERY NIGHT AT BEDTIME FOR 1 WEEK, THEN INCREASE TO TWO CAPSULES EVERY NIGHT AT BEDTIME FOR MOOD      Eliquis 2.5 MG tablet tablet TAKE ONE TABLET BY MOUTH TWO TIMES A DAY FOR CIRCULATION      gabapentin (NEURONTIN) 300 MG capsule TAKE THREE CAPSULES BY MOUTH THREE TIMES A DAY AS NEEDED FOR PERIPHERAL AND NEUROPATHIC PAIN      HYDROcodone-acetaminophen (LORTAB)  MG per tablet Take 1 tablet by mouth 4 (Four) Times a Day As Needed for Moderate Pain.      hydrOXYzine (VISTARIL) 50 MG capsule Take 1 capsule by mouth 3 (Three) Times a Day As Needed for Anxiety for up to 20 doses. 20 capsule 0    metoprolol tartrate (LOPRESSOR) 25 MG tablet Take 1 tablet by mouth Daily. 30 tablet 11    mupirocin (BACTROBAN) 2 % ointment Apply 1 Application topically to the appropriate area as directed Daily. 30 g 0    nitroglycerin (NITROSTAT) 0.4 MG SL tablet 1 under the tongue as needed for angina, may repeat q5mins for up three doses 25 tablet 0    ondansetron (ZOFRAN) 8 MG tablet Take 1 tablet by mouth Daily As Needed.      pantoprazole (PROTONIX) 40 MG EC tablet Take 1 tablet by mouth As Needed.      polyethylene glycol (MIRALAX) packet Take 17 g by mouth Daily.      rosuvastatin (CRESTOR) 10 MG tablet Take 1 tablet by mouth Daily. 30 tablet 11    sodium chloride (OCEAN) 0.65 % nasal spray Administer 1 spray into the nostril(s) as directed by provider As Needed for Congestion.      tamsulosin (FLOMAX) 0.4 MG capsule 24 hr capsule TAKE ONE CAPSULE BY MOUTH EVERY DAY FOR PROSTATE       No current facility-administered medications for this visit.        Allergies:   Allergies   Allergen Reactions    Pseudoephedrine      Very hyper        Past  Surgical History:  Past Surgical History:   Procedure Laterality Date    CARDIAC CATHETERIZATION      CARDIAC CATHETERIZATION N/A 2/17/2017    Procedure: Left Heart Cath;  Surgeon: Horacio Bedoya MD;  Location:  [facility];  Service:     ENDOSCOPY N/A 11/7/2016    Procedure: ESOPHAGOGASTRODUODENOSCOPY;  Surgeon: Henry Smyth DO;  Location:  [facility];  Service:     FINGER SURGERY      NASAL SEPTUM SURGERY      UMBILICAL HERNIA REPAIR         Social History:  Social History     Socioeconomic History    Marital status:    Tobacco Use    Smoking status: Every Day     Current packs/day: 3.50     Types: Cigarettes     Passive exposure: Current    Smokeless tobacco: Never   Vaping Use    Vaping status: Never Used   Substance and Sexual Activity    Alcohol use: No    Drug use: No    Sexual activity: Defer       Family History:  Family History   Problem Relation Age of Onset    Dementia Mother     Emphysema Mother     Alcohol abuse Father     Cancer Father     Heart disease Father     Heart attack Father     Cancer Brother     Hypertension Brother     Breast cancer Sister     Heart attack Maternal Grandfather     Heart attack Paternal Grandfather     Heart attack Paternal Uncle        Review of Systems:  Review of Systems   Constitutional:  Positive for fatigue. Negative for chills, fever and unexpected weight change.   HENT:  Negative for congestion and sinus pressure.    Respiratory:  Negative for chest tightness and shortness of breath.    Cardiovascular:  Negative for chest pain.   Gastrointestinal:  Negative for abdominal pain, constipation, diarrhea, nausea and vomiting.   Genitourinary:  Positive for difficulty urinating. Negative for dysuria, flank pain, frequency, hematuria and urgency.   Musculoskeletal:  Negative for back pain and neck pain.   Skin:  Negative for rash.   Neurological:  Negative for dizziness and headaches.   Hematological:  Bruises/bleeds easily.    Psychiatric/Behavioral:  Negative for confusion and suicidal ideas. The patient is not nervous/anxious.        Physical Exam:  Physical Exam  Constitutional:       General: He is not in acute distress.     Appearance: Normal appearance.   HENT:      Head: Normocephalic and atraumatic.      Nose: Nose normal.      Mouth/Throat:      Mouth: Mucous membranes are moist.   Eyes:      Conjunctiva/sclera: Conjunctivae normal.   Cardiovascular:      Rate and Rhythm: Normal rate and regular rhythm.      Pulses: Normal pulses.      Heart sounds: Normal heart sounds.   Pulmonary:      Effort: Pulmonary effort is normal.      Breath sounds: Normal breath sounds.   Abdominal:      General: Bowel sounds are normal.      Palpations: Abdomen is soft.   Genitourinary:     Penis: Normal.       Testes: Normal.   Musculoskeletal:         General: Normal range of motion.      Cervical back: Normal range of motion.   Skin:     General: Skin is warm.   Neurological:      General: No focal deficit present.      Mental Status: He is alert and oriented to person, place, and time.   Psychiatric:         Mood and Affect: Mood normal.         Behavior: Behavior normal.         Thought Content: Thought content normal.         Judgment: Judgment normal.       Recent Image (CT and/or KUB):   CT Abdomen and Pelvis: No results found for this or any previous visit.     CT Stone Protocol: No results found for this or any previous visit.     KUB: No results found for this or any previous visit.       Labs:  Brief Urine Lab Results       None          Admission on 12/08/2024, Discharged on 12/08/2024   Component Date Value Ref Range Status    Glucose 12/08/2024 100 (H)  65 - 99 mg/dL Final    BUN 12/08/2024 5 (L)  8 - 23 mg/dL Final    Creatinine 12/08/2024 0.73 (L)  0.76 - 1.27 mg/dL Final    Sodium 12/08/2024 135 (L)  136 - 145 mmol/L Final    Potassium 12/08/2024 4.2  3.5 - 5.2 mmol/L Final    Slight hemolysis detected by analyzer. Result may be  falsely elevated.    Chloride 12/08/2024 95 (L)  98 - 107 mmol/L Final    CO2 12/08/2024 30.9 (H)  22.0 - 29.0 mmol/L Final    Calcium 12/08/2024 9.3  8.6 - 10.5 mg/dL Final    Total Protein 12/08/2024 6.9  6.0 - 8.5 g/dL Final    Albumin 12/08/2024 3.7  3.5 - 5.2 g/dL Final    ALT (SGPT) 12/08/2024 5  1 - 41 U/L Final    AST (SGOT) 12/08/2024 10  1 - 40 U/L Final    Alkaline Phosphatase 12/08/2024 118 (H)  39 - 117 U/L Final    Total Bilirubin 12/08/2024 0.3  0.0 - 1.2 mg/dL Final    Globulin 12/08/2024 3.2  gm/dL Final    A/G Ratio 12/08/2024 1.2  g/dL Final    BUN/Creatinine Ratio 12/08/2024 6.8 (L)  7.0 - 25.0 Final    Anion Gap 12/08/2024 9.1  5.0 - 15.0 mmol/L Final    eGFR 12/08/2024 102.9  >60.0 mL/min/1.73 Final    WBC 12/08/2024 5.37  3.40 - 10.80 10*3/mm3 Final    RBC 12/08/2024 4.23  4.14 - 5.80 10*6/mm3 Final    Hemoglobin 12/08/2024 12.9 (L)  13.0 - 17.7 g/dL Final    Hematocrit 12/08/2024 41.0  37.5 - 51.0 % Final    MCV 12/08/2024 96.9  79.0 - 97.0 fL Final    MCH 12/08/2024 30.5  26.6 - 33.0 pg Final    MCHC 12/08/2024 31.5  31.5 - 35.7 g/dL Final    RDW 12/08/2024 13.5  12.3 - 15.4 % Final    RDW-SD 12/08/2024 48.3  37.0 - 54.0 fl Final    MPV 12/08/2024 9.5  6.0 - 12.0 fL Final    Platelets 12/08/2024 372  140 - 450 10*3/mm3 Final    Neutrophil % 12/08/2024 58.6  42.7 - 76.0 % Final    Lymphocyte % 12/08/2024 28.9  19.6 - 45.3 % Final    Monocyte % 12/08/2024 8.4  5.0 - 12.0 % Final    Eosinophil % 12/08/2024 2.6  0.3 - 6.2 % Final    Basophil % 12/08/2024 1.1  0.0 - 1.5 % Final    Immature Grans % 12/08/2024 0.4  0.0 - 0.5 % Final    Neutrophils, Absolute 12/08/2024 3.15  1.70 - 7.00 10*3/mm3 Final    Lymphocytes, Absolute 12/08/2024 1.55  0.70 - 3.10 10*3/mm3 Final    Monocytes, Absolute 12/08/2024 0.45  0.10 - 0.90 10*3/mm3 Final    Eosinophils, Absolute 12/08/2024 0.14  0.00 - 0.40 10*3/mm3 Final    Basophils, Absolute 12/08/2024 0.06  0.00 - 0.20 10*3/mm3 Final    Immature Grans, Absolute  "12/08/2024 0.02  0.00 - 0.05 10*3/mm3 Final    nRBC 12/08/2024 0.0  0.0 - 0.2 /100 WBC Final    Extra Tube 12/08/2024 Hold for add-ons.   Final    Auto resulted.    Extra Tube 12/08/2024 hold for add-on   Final    Auto resulted    Extra Tube 12/08/2024 Hold for add-ons.   Final    Auto resulted.    Extra Tube 12/08/2024 Hold for add-ons.   Final    Auto resulted        Procedure:  Cath Change, Simple    Date/Time: 12/18/2024 3:58 PM    Performed by: Bacilio Bardales PA-C  Authorized by: Bacilio Bardales PA-C  Consent: Verbal consent obtained.  Risks and benefits: risks, benefits and alternatives were discussed  Consent given by: patient  Patient understanding: patient states understanding of the procedure being performed  Patient consent: the patient's understanding of the procedure matches consent given  Procedure consent: procedure consent matches procedure scheduled  Relevant documents: relevant documents present and verified  Test results: test results available and properly labeled  Site marked: the operative site was marked  Imaging studies: imaging studies available  Patient identity confirmed: verbally with patient  Time out: Immediately prior to procedure a \"time out\" was called to verify the correct patient, procedure, equipment, support staff and site/side marked as required.  Preparation: Patient was prepped and draped in the usual sterile fashion.  Local anesthesia used: yes    Anesthesia:  Local anesthesia used: yes  Local Anesthetic: topical anesthetic    Sedation:  Patient sedated: no    Patient tolerance: patient tolerated the procedure well with no immediate complications           Assessment/Plan:   Problem List Items Addressed This Visit       Neurogenic bladder    Elevated prostate specific antigen (PSA) - Primary    Relevant Orders    PSA DIAGNOSTIC         Health Maintenance:   Health Maintenance Due   Topic Date Due    COLORECTAL CANCER SCREENING  Never done    Pneumococcal Vaccine 0-64 (1 " of 2 - PCV) Never done    URINE MICROALBUMIN  Never done    ZOSTER VACCINE (1 of 2) Never done    HEPATITIS C SCREENING  Never done    DIABETIC EYE EXAM  10/31/2017    INFLUENZA VACCINE  Never done    COVID-19 Vaccine (3 - 2024-25 season) 09/01/2024        Smoking Counseling: Everyday smoker.  Never used smokeless tobacco.  Counseling given.    Urine Incontinence: Patient reports that he is not currently experiencing any symptoms of urinary incontinence.    Patient was given instructions and counseling regarding his condition or for health maintenance advice. Please see specific information pulled into the AVS if appropriate.    Patient Education:   Mercado catheter exchange -patient presents for monthly catheter care.  I did exchange the patient's 18 Canadian coudé catheter in office today and he tolerated procedure well with no acute complications.  Yellow urine was noted in return.  Will have him follow-up in 1 month for repeat cath exchange  Elevated PSA-I will repeat a PSA in office today and call patient with results once available.  If still elevated we will proceed forward with an MRI of the prostate with and without contrast for evaluation of prostatic carcinoma.  Given history of neurogenic bladder and chronic indwelling Mercado catheter this could be source of elevated PSA levels.  I will call patient's caregiver and we will see him back in a month or sooner if needed    Visit Diagnoses:    ICD-10-CM ICD-9-CM   1. Elevated prostate specific antigen (PSA)  R97.20 790.93   2. Neurogenic bladder  N31.9 596.54     A total of 10 minutes were spent coordinating this patient’s care in clinic today; 15 minutes of which were face-to-face with the patient, reviewing medical history and counseling on the current treatment and followup plan.  All questions were answered to patient's satisfaction.    Meds Ordered During Visit:  No orders of the defined types were placed in this encounter.      Follow Up Appointment: 1  month  No follow-ups on file.      This document has been electronically signed by Bacilio Bardales PA-C   December 18, 2024 16:00 EST    Part of this note may be an electronic transcription/translation of spoken language to printed text using the Dragon Dictation System.

## 2024-12-19 ENCOUNTER — TELEPHONE (OUTPATIENT)
Dept: UROLOGY | Facility: CLINIC | Age: 64
End: 2024-12-19
Payer: MEDICARE

## 2024-12-19 LAB — PSA SERPL-MCNC: 4.3 NG/ML (ref 0–4)

## 2024-12-19 NOTE — TELEPHONE ENCOUNTER
Attempted to call patient about PSA results however he did not answer.  Did leave a voicemail advising lab work decreased but was still elevated.  Educated that we can workup further with an MRI if he wishes.  Otherwise we will keep his follow-up in 2 months.  Advised to call back with questions or concerns.

## 2025-01-01 ENCOUNTER — DOCUMENTATION (OUTPATIENT)
Dept: INFECTIOUS DISEASES | Facility: CLINIC | Age: 65
End: 2025-01-01
Payer: MEDICARE

## 2025-01-20 ENCOUNTER — HOSPITAL ENCOUNTER (EMERGENCY)
Facility: HOSPITAL | Age: 65
Discharge: HOME | End: 2025-01-21
Admitting: EMERGENCY MEDICINE
Payer: MEDICARE

## 2025-01-20 ENCOUNTER — APPOINTMENT (OUTPATIENT)
Dept: MRI IMAGING | Facility: HOSPITAL | Age: 65
End: 2025-01-20
Payer: MEDICARE

## 2025-01-20 DIAGNOSIS — N39.0 ACUTE UTI: Primary | ICD-10-CM

## 2025-01-20 DIAGNOSIS — M51.360 DEGENERATION OF INTERVERTEBRAL DISC OF LUMBAR REGION WITH DISCOGENIC BACK PAIN: ICD-10-CM

## 2025-01-20 LAB
ALBUMIN SERPL-MCNC: 3.4 G/DL (ref 3.5–5.2)
ALBUMIN/GLOB SERPL: 0.9 G/DL
ALP SERPL-CCNC: 105 U/L (ref 39–117)
ALT SERPL W P-5'-P-CCNC: 6 U/L (ref 1–41)
AMPHET+METHAMPHET UR QL: NEGATIVE
AMPHETAMINES UR QL: NEGATIVE
ANION GAP SERPL CALCULATED.3IONS-SCNC: 12.6 MMOL/L (ref 5–15)
AST SERPL-CCNC: 12 U/L (ref 1–40)
B PARAPERT DNA SPEC QL NAA+PROBE: NOT DETECTED
B PERT DNA SPEC QL NAA+PROBE: NOT DETECTED
BACTERIA UR QL AUTO: ABNORMAL /HPF
BARBITURATES UR QL SCN: NEGATIVE
BASOPHILS # BLD AUTO: 0.02 10*3/MM3 (ref 0–0.2)
BASOPHILS NFR BLD AUTO: 0.2 % (ref 0–1.5)
BENZODIAZ UR QL SCN: NEGATIVE
BILIRUB SERPL-MCNC: 0.7 MG/DL (ref 0–1.2)
BILIRUB UR QL STRIP: NEGATIVE
BUN SERPL-MCNC: 6 MG/DL (ref 8–23)
BUN/CREAT SERPL: 8.2 (ref 7–25)
BUPRENORPHINE SERPL-MCNC: NEGATIVE NG/ML
C PNEUM DNA NPH QL NAA+NON-PROBE: NOT DETECTED
CALCIUM SPEC-SCNC: 8.8 MG/DL (ref 8.6–10.5)
CANNABINOIDS SERPL QL: NEGATIVE
CHLORIDE SERPL-SCNC: 92 MMOL/L (ref 98–107)
CLARITY UR: ABNORMAL
CO2 SERPL-SCNC: 29.4 MMOL/L (ref 22–29)
COCAINE UR QL: NEGATIVE
COLOR UR: YELLOW
CREAT SERPL-MCNC: 0.73 MG/DL (ref 0.76–1.27)
CRP SERPL-MCNC: 17.94 MG/DL (ref 0–0.5)
DEPRECATED RDW RBC AUTO: 47.8 FL (ref 37–54)
EGFRCR SERPLBLD CKD-EPI 2021: 102.9 ML/MIN/1.73
EOSINOPHIL # BLD AUTO: 0 10*3/MM3 (ref 0–0.4)
EOSINOPHIL NFR BLD AUTO: 0 % (ref 0.3–6.2)
ERYTHROCYTE [DISTWIDTH] IN BLOOD BY AUTOMATED COUNT: 13.7 % (ref 12.3–15.4)
ERYTHROCYTE [SEDIMENTATION RATE] IN BLOOD: 94 MM/HR (ref 0–20)
FENTANYL UR-MCNC: POSITIVE NG/ML
FLUAV SUBTYP SPEC NAA+PROBE: NOT DETECTED
FLUBV RNA ISLT QL NAA+PROBE: NOT DETECTED
GEN 5 1HR TROPONIN T REFLEX: 23 NG/L
GLOBULIN UR ELPH-MCNC: 3.9 GM/DL
GLUCOSE SERPL-MCNC: 114 MG/DL (ref 65–99)
GLUCOSE UR STRIP-MCNC: NEGATIVE MG/DL
HADV DNA SPEC NAA+PROBE: NOT DETECTED
HCOV 229E RNA SPEC QL NAA+PROBE: NOT DETECTED
HCOV HKU1 RNA SPEC QL NAA+PROBE: NOT DETECTED
HCOV NL63 RNA SPEC QL NAA+PROBE: NOT DETECTED
HCOV OC43 RNA SPEC QL NAA+PROBE: NOT DETECTED
HCT VFR BLD AUTO: 37 % (ref 37.5–51)
HGB BLD-MCNC: 12.1 G/DL (ref 13–17.7)
HGB UR QL STRIP.AUTO: ABNORMAL
HMPV RNA NPH QL NAA+NON-PROBE: NOT DETECTED
HOLD SPECIMEN: NORMAL
HOLD SPECIMEN: NORMAL
HPIV1 RNA ISLT QL NAA+PROBE: NOT DETECTED
HPIV2 RNA SPEC QL NAA+PROBE: NOT DETECTED
HPIV3 RNA NPH QL NAA+PROBE: NOT DETECTED
HPIV4 P GENE NPH QL NAA+PROBE: NOT DETECTED
HYALINE CASTS UR QL AUTO: ABNORMAL /LPF
IMM GRANULOCYTES # BLD AUTO: 0.07 10*3/MM3 (ref 0–0.05)
IMM GRANULOCYTES NFR BLD AUTO: 0.7 % (ref 0–0.5)
KETONES UR QL STRIP: NEGATIVE
LEUKOCYTE ESTERASE UR QL STRIP.AUTO: ABNORMAL
LYMPHOCYTES # BLD AUTO: 0.49 10*3/MM3 (ref 0.7–3.1)
LYMPHOCYTES NFR BLD AUTO: 4.9 % (ref 19.6–45.3)
M PNEUMO IGG SER IA-ACNC: NOT DETECTED
MCH RBC QN AUTO: 30.8 PG (ref 26.6–33)
MCHC RBC AUTO-ENTMCNC: 32.7 G/DL (ref 31.5–35.7)
MCV RBC AUTO: 94.1 FL (ref 79–97)
METHADONE UR QL SCN: NEGATIVE
MONOCYTES # BLD AUTO: 1 10*3/MM3 (ref 0.1–0.9)
MONOCYTES NFR BLD AUTO: 10.1 % (ref 5–12)
NEUTROPHILS NFR BLD AUTO: 8.36 10*3/MM3 (ref 1.7–7)
NEUTROPHILS NFR BLD AUTO: 84.1 % (ref 42.7–76)
NITRITE UR QL STRIP: POSITIVE
NRBC BLD AUTO-RTO: 0 /100 WBC (ref 0–0.2)
NT-PROBNP SERPL-MCNC: 459 PG/ML (ref 0–900)
OPIATES UR QL: POSITIVE
OXYCODONE UR QL SCN: NEGATIVE
PCP UR QL SCN: NEGATIVE
PH UR STRIP.AUTO: 5.5 [PH] (ref 5–8)
PLATELET # BLD AUTO: 271 10*3/MM3 (ref 140–450)
PMV BLD AUTO: 9.6 FL (ref 6–12)
POTASSIUM SERPL-SCNC: 3.2 MMOL/L (ref 3.5–5.2)
PROT SERPL-MCNC: 7.3 G/DL (ref 6–8.5)
PROT UR QL STRIP: ABNORMAL
RBC # BLD AUTO: 3.93 10*6/MM3 (ref 4.14–5.8)
RBC # UR STRIP: ABNORMAL /HPF
REF LAB TEST METHOD: ABNORMAL
RHINOVIRUS RNA SPEC NAA+PROBE: NOT DETECTED
RSV RNA NPH QL NAA+NON-PROBE: NOT DETECTED
SARS-COV-2 RNA RESP QL NAA+PROBE: NOT DETECTED
SODIUM SERPL-SCNC: 134 MMOL/L (ref 136–145)
SP GR UR STRIP: 1.01 (ref 1–1.03)
SQUAMOUS #/AREA URNS HPF: ABNORMAL /HPF
TRICYCLICS UR QL SCN: NEGATIVE
TROPONIN T % DELTA: 0
TROPONIN T NUMERIC DELTA: 0 NG/L
TROPONIN T SERPL HS-MCNC: 23 NG/L
UROBILINOGEN UR QL STRIP: ABNORMAL
WBC # UR STRIP: ABNORMAL /HPF
WBC NRBC COR # BLD AUTO: 9.94 10*3/MM3 (ref 3.4–10.8)
WHOLE BLOOD HOLD COAG: NORMAL
WHOLE BLOOD HOLD SPECIMEN: NORMAL

## 2025-01-20 PROCEDURE — 25010000002 HYDROMORPHONE 1 MG/ML SOLUTION: Performed by: NURSE PRACTITIONER

## 2025-01-20 PROCEDURE — 87186 SC STD MICRODIL/AGAR DIL: CPT | Performed by: NURSE PRACTITIONER

## 2025-01-20 PROCEDURE — 85025 COMPLETE CBC W/AUTO DIFF WBC: CPT | Performed by: NURSE PRACTITIONER

## 2025-01-20 PROCEDURE — 83880 ASSAY OF NATRIURETIC PEPTIDE: CPT | Performed by: NURSE PRACTITIONER

## 2025-01-20 PROCEDURE — 25810000003 SEPSIS FLUID NS 0.9 % SOLUTION: Performed by: NURSE PRACTITIONER

## 2025-01-20 PROCEDURE — 36415 COLL VENOUS BLD VENIPUNCTURE: CPT

## 2025-01-20 PROCEDURE — 87040 BLOOD CULTURE FOR BACTERIA: CPT | Performed by: NURSE PRACTITIONER

## 2025-01-20 PROCEDURE — 83605 ASSAY OF LACTIC ACID: CPT | Performed by: NURSE PRACTITIONER

## 2025-01-20 PROCEDURE — A9577 INJ MULTIHANCE: HCPCS

## 2025-01-20 PROCEDURE — 72158 MRI LUMBAR SPINE W/O & W/DYE: CPT

## 2025-01-20 PROCEDURE — 80053 COMPREHEN METABOLIC PANEL: CPT | Performed by: NURSE PRACTITIONER

## 2025-01-20 PROCEDURE — 80307 DRUG TEST PRSMV CHEM ANLYZR: CPT | Performed by: NURSE PRACTITIONER

## 2025-01-20 PROCEDURE — 86140 C-REACTIVE PROTEIN: CPT | Performed by: NURSE PRACTITIONER

## 2025-01-20 PROCEDURE — 84484 ASSAY OF TROPONIN QUANT: CPT | Performed by: NURSE PRACTITIONER

## 2025-01-20 PROCEDURE — 87077 CULTURE AEROBIC IDENTIFY: CPT | Performed by: NURSE PRACTITIONER

## 2025-01-20 PROCEDURE — 0202U NFCT DS 22 TRGT SARS-COV-2: CPT | Performed by: NURSE PRACTITIONER

## 2025-01-20 PROCEDURE — 81001 URINALYSIS AUTO W/SCOPE: CPT | Performed by: NURSE PRACTITIONER

## 2025-01-20 PROCEDURE — 85652 RBC SED RATE AUTOMATED: CPT | Performed by: NURSE PRACTITIONER

## 2025-01-20 PROCEDURE — 87086 URINE CULTURE/COLONY COUNT: CPT | Performed by: NURSE PRACTITIONER

## 2025-01-20 PROCEDURE — 51702 INSERT TEMP BLADDER CATH: CPT

## 2025-01-20 PROCEDURE — 96375 TX/PRO/DX INJ NEW DRUG ADDON: CPT

## 2025-01-20 PROCEDURE — 25010000002 ONDANSETRON PER 1 MG: Performed by: NURSE PRACTITIONER

## 2025-01-20 PROCEDURE — 93005 ELECTROCARDIOGRAM TRACING: CPT | Performed by: NURSE PRACTITIONER

## 2025-01-20 PROCEDURE — 99285 EMERGENCY DEPT VISIT HI MDM: CPT

## 2025-01-20 PROCEDURE — 87154 CUL TYP ID BLD PTHGN 6+ TRGT: CPT | Performed by: NURSE PRACTITIONER

## 2025-01-20 PROCEDURE — 25510000002 GADOBENATE DIMEGLUMINE 529 MG/ML SOLUTION

## 2025-01-20 RX ORDER — POTASSIUM CHLORIDE 1500 MG/1
40 TABLET, EXTENDED RELEASE ORAL ONCE
Status: COMPLETED | OUTPATIENT
Start: 2025-01-20 | End: 2025-01-20

## 2025-01-20 RX ORDER — ONDANSETRON 2 MG/ML
4 INJECTION INTRAMUSCULAR; INTRAVENOUS ONCE
Status: COMPLETED | OUTPATIENT
Start: 2025-01-20 | End: 2025-01-20

## 2025-01-20 RX ADMIN — SODIUM CHLORIDE 2178 ML: 900 INJECTION, SOLUTION INTRAVENOUS at 23:20

## 2025-01-20 RX ADMIN — GADOBENATE DIMEGLUMINE 14 ML: 529 INJECTION, SOLUTION INTRAVENOUS at 22:54

## 2025-01-20 RX ADMIN — POTASSIUM CHLORIDE 40 MEQ: 1500 TABLET, EXTENDED RELEASE ORAL at 23:20

## 2025-01-20 RX ADMIN — ONDANSETRON 4 MG: 2 INJECTION INTRAMUSCULAR; INTRAVENOUS at 23:19

## 2025-01-20 RX ADMIN — HYDROMORPHONE HYDROCHLORIDE 1 MG: 1 INJECTION, SOLUTION INTRAMUSCULAR; INTRAVENOUS; SUBCUTANEOUS at 23:19

## 2025-01-21 VITALS
WEIGHT: 160 LBS | TEMPERATURE: 98.7 F | DIASTOLIC BLOOD PRESSURE: 76 MMHG | HEIGHT: 76 IN | HEART RATE: 112 BPM | OXYGEN SATURATION: 92 % | BODY MASS INDEX: 19.48 KG/M2 | RESPIRATION RATE: 20 BRPM | SYSTOLIC BLOOD PRESSURE: 133 MMHG

## 2025-01-21 LAB
BACTERIA BLD CULT: ABNORMAL
BOTTLE TYPE: ABNORMAL
D-LACTATE SERPL-SCNC: 1.9 MMOL/L (ref 0.5–2)
QT INTERVAL: 318 MS
QTC INTERVAL: 442 MS

## 2025-01-21 PROCEDURE — 96365 THER/PROPH/DIAG IV INF INIT: CPT

## 2025-01-21 PROCEDURE — 25010000002 CEFTRIAXONE PER 250 MG: Performed by: NURSE PRACTITIONER

## 2025-01-21 PROCEDURE — 72158 MRI LUMBAR SPINE W/O & W/DYE: CPT | Performed by: RADIOLOGY

## 2025-01-21 RX ORDER — CEFDINIR 300 MG/1
300 CAPSULE ORAL 2 TIMES DAILY
Qty: 14 CAPSULE | Refills: 0 | Status: SHIPPED | OUTPATIENT
Start: 2025-01-21 | End: 2025-01-28

## 2025-01-21 RX ADMIN — CEFTRIAXONE 1000 MG: 1 INJECTION, POWDER, FOR SOLUTION INTRAMUSCULAR; INTRAVENOUS at 00:05

## 2025-01-21 NOTE — ED NOTES
Patient wasn't in room when I went to collect blood cultures, will attempt again when patient returns

## 2025-01-21 NOTE — ED PROVIDER NOTES
"Subjective   History of Present Illness  Patient is a 64-year-old male presents today complaining of severe lower back pain with radiation down legs, fever, chills, and possible UTI.  Patient reports he does have a chronic indwelling Mercado that was changed about 3 weeks ago.  Patient was last time he had back pain like this he did have a UTI but states that he feels like this is much worse.  Patient reports, \"I feel like something is going on this time worse than just a UTI.\"    History provided by:  Patient   used: No        Review of Systems   Constitutional:  Positive for chills.   HENT: Negative.     Eyes: Negative.    Respiratory: Negative.     Cardiovascular: Negative.    Gastrointestinal: Negative.    Endocrine: Negative.    Genitourinary:  Positive for dysuria.   Musculoskeletal:  Positive for back pain.   Skin: Negative.    Allergic/Immunologic: Negative.    Neurological:  Positive for weakness.   Hematological: Negative.    Psychiatric/Behavioral: Negative.         Past Medical History:   Diagnosis Date    Abnormal heart rhythm     Acid reflux     Anxiety     Bell's palsy     C. difficile colitis     Chronic idiopathic constipation     COPD (chronic obstructive pulmonary disease)     Depression     Diverticulitis     Hyperlipidemia     Hypertension     IBS (irritable bowel syndrome)     Kidney stones     Myocardial infarction     Pneumothorax     Ruptured disc, cervical     Stroke     Umbilical hernia        Allergies   Allergen Reactions    Pseudoephedrine      Very hyper       Past Surgical History:   Procedure Laterality Date    CARDIAC CATHETERIZATION      CARDIAC CATHETERIZATION N/A 2/17/2017    Procedure: Left Heart Cath;  Surgeon: Horacio Bedoya MD;  Location:  [email or web address];  Service:     ENDOSCOPY N/A 11/7/2016    Procedure: ESOPHAGOGASTRODUODENOSCOPY;  Surgeon: Henry Smyth DO;  Location:  [facility];  Service:     FINGER SURGERY      NASAL SEPTUM " SURGERY      UMBILICAL HERNIA REPAIR         Family History   Problem Relation Age of Onset    Dementia Mother     Emphysema Mother     Alcohol abuse Father     Cancer Father     Heart disease Father     Heart attack Father     Cancer Brother     Hypertension Brother     Breast cancer Sister     Heart attack Maternal Grandfather     Heart attack Paternal Grandfather     Heart attack Paternal Uncle        Social History     Socioeconomic History    Marital status:    Tobacco Use    Smoking status: Every Day     Current packs/day: 3.50     Types: Cigarettes     Passive exposure: Current    Smokeless tobacco: Never   Vaping Use    Vaping status: Never Used   Substance and Sexual Activity    Alcohol use: No    Drug use: No    Sexual activity: Defer           Objective   Physical Exam  Vitals and nursing note reviewed.   Constitutional:       Appearance: He is well-developed.   HENT:      Head: Normocephalic.      Right Ear: External ear normal.      Left Ear: External ear normal.   Eyes:      Conjunctiva/sclera: Conjunctivae normal.      Pupils: Pupils are equal, round, and reactive to light.   Cardiovascular:      Rate and Rhythm: Normal rate and regular rhythm.      Heart sounds: Normal heart sounds.   Pulmonary:      Effort: Pulmonary effort is normal.      Breath sounds: Normal breath sounds.   Abdominal:      General: Bowel sounds are normal.      Palpations: Abdomen is soft.   Musculoskeletal:         General: Normal range of motion.      Cervical back: Normal range of motion and neck supple. Tenderness present. Decreased range of motion.   Skin:     General: Skin is warm and dry.      Capillary Refill: Capillary refill takes less than 2 seconds.   Neurological:      Mental Status: He is alert and oriented to person, place, and time.   Psychiatric:         Behavior: Behavior normal.         Thought Content: Thought content normal.         Procedures           ED Course  ED Course as of 01/21/25 0130   Mon  "Jan 20, 2025 2156 Endorsed to FRANCISCO Valverde [ANA]   2222 EKG at 2047 shows sinus tachycardia, rate 116.  LA interval 158, QRS duration 98, QTc 442 ms.  Occasional supraventricular ectopic.  Baseline artifact.  Nonspecific ST-T changes.  No evidence for STEMI.  Evidence for left anterior fascicular block and incomplete right bundle branch block.  Electronically signed by Luiz Jena MD, 01/20/25, 10:23 PM EST.   [CM]   2313 C-Reactive Protein(!): 17.94 [SS]      ED Course User Index  [CM] Luiz Jean MD  [ANA] Dave Cutler APRN  [SS] Amada Dorsey APRN                                                       Medical Decision Making  Patient is a 64-year-old male presents today complaining of severe lower back pain with radiation down legs, fever, chills, and possible UTI.  Patient reports he does have a chronic indwelling Mercado that was changed about 3 weeks ago.  Patient was last time he had back pain like this he did have a UTI but states that he feels like this is much worse.  Patient reports, \"I feel like something is going on this time worse than just a UTI.\"    Work up and results were discussed throughly with the patients.  The patient will be discharged for further monitoring and management with their PCP.  Red flags, warning signs, worsening symptoms, and when to return to the ER discussed with and understood by the patients.  Patient will follow up with their PCP in a timely manner.  Vitals stable at discharge.    Problems Addressed:  Acute UTI: complicated acute illness or injury  Degeneration of intervertebral disc of lumbar region with discogenic back pain: complicated acute illness or injury    Amount and/or Complexity of Data Reviewed  Labs: ordered. Decision-making details documented in ED Course.  Radiology: ordered.  ECG/medicine tests: ordered.    Risk  Prescription drug management.        Final diagnoses:   Acute UTI   Degeneration of intervertebral disc of lumbar " region with discogenic back pain       ED Disposition  ED Disposition       ED Disposition   Discharge    Condition   Stable    Comment   --               Cas Estrada MD  [address] [place] [address] [phone]    Schedule an appointment as soon as possible for a visit            Medication List        New Prescriptions      cefdinir 300 MG capsule  Commonly known as: OMNICEF  Take 1 capsule by mouth 2 (Two) Times a Day for 7 days.               Where to Get Your Medications        These medications were sent to [place] [address]  [phone] [facility] [phone] [facility] [address]inder Beltran [address] [address]      Phone: [phone]   cefdinir 300 MG capsule            Amada Dorsey, APRN  01/21/25 0130

## 2025-01-23 ENCOUNTER — OFFICE VISIT (OUTPATIENT)
Dept: INFECTIOUS DISEASES | Facility: CLINIC | Age: 65
End: 2025-01-23
Payer: MEDICARE

## 2025-01-23 VITALS
DIASTOLIC BLOOD PRESSURE: 59 MMHG | HEIGHT: 76 IN | BODY MASS INDEX: 19.48 KG/M2 | OXYGEN SATURATION: 95 % | HEART RATE: 68 BPM | SYSTOLIC BLOOD PRESSURE: 136 MMHG | WEIGHT: 160 LBS

## 2025-01-23 DIAGNOSIS — R78.81 E COLI BACTEREMIA: Primary | ICD-10-CM

## 2025-01-23 DIAGNOSIS — B96.20 E COLI BACTEREMIA: Primary | ICD-10-CM

## 2025-01-23 LAB
BACTERIA SPEC AEROBE CULT: ABNORMAL
BASOPHILS # BLD AUTO: 0.03 10*3/MM3 (ref 0–0.2)
BASOPHILS NFR BLD AUTO: 0.4 % (ref 0–1.5)
CRP SERPL-MCNC: 16.78 MG/DL (ref 0–0.5)
DEPRECATED RDW RBC AUTO: 47.6 FL (ref 37–54)
EOSINOPHIL # BLD AUTO: 0.03 10*3/MM3 (ref 0–0.4)
EOSINOPHIL NFR BLD AUTO: 0.4 % (ref 0.3–6.2)
ERYTHROCYTE [DISTWIDTH] IN BLOOD BY AUTOMATED COUNT: 13.7 % (ref 12.3–15.4)
GRAM STN SPEC: ABNORMAL
HCT VFR BLD AUTO: 42.2 % (ref 37.5–51)
HGB BLD-MCNC: 13.5 G/DL (ref 13–17.7)
IMM GRANULOCYTES # BLD AUTO: 0.04 10*3/MM3 (ref 0–0.05)
IMM GRANULOCYTES NFR BLD AUTO: 0.6 % (ref 0–0.5)
ISOLATED FROM: ABNORMAL
ISOLATED FROM: ABNORMAL
LYMPHOCYTES # BLD AUTO: 0.71 10*3/MM3 (ref 0.7–3.1)
LYMPHOCYTES NFR BLD AUTO: 10.2 % (ref 19.6–45.3)
MCH RBC QN AUTO: 30.1 PG (ref 26.6–33)
MCHC RBC AUTO-ENTMCNC: 32 G/DL (ref 31.5–35.7)
MCV RBC AUTO: 94 FL (ref 79–97)
MONOCYTES # BLD AUTO: 0.47 10*3/MM3 (ref 0.1–0.9)
MONOCYTES NFR BLD AUTO: 6.8 % (ref 5–12)
NEUTROPHILS NFR BLD AUTO: 5.67 10*3/MM3 (ref 1.7–7)
NEUTROPHILS NFR BLD AUTO: 81.6 % (ref 42.7–76)
NRBC BLD AUTO-RTO: 0 /100 WBC (ref 0–0.2)
PLATELET # BLD AUTO: 380 10*3/MM3 (ref 140–450)
PMV BLD AUTO: 10.3 FL (ref 6–12)
RBC # BLD AUTO: 4.49 10*6/MM3 (ref 4.14–5.8)
WBC NRBC COR # BLD AUTO: 6.95 10*3/MM3 (ref 3.4–10.8)

## 2025-01-23 PROCEDURE — 86140 C-REACTIVE PROTEIN: CPT | Performed by: NURSE PRACTITIONER

## 2025-01-23 PROCEDURE — 85025 COMPLETE CBC W/AUTO DIFF WBC: CPT | Performed by: NURSE PRACTITIONER

## 2025-01-23 PROCEDURE — 87040 BLOOD CULTURE FOR BACTERIA: CPT | Performed by: NURSE PRACTITIONER

## 2025-01-23 RX ORDER — CEFDINIR 300 MG/1
300 CAPSULE ORAL 2 TIMES DAILY
Qty: 14 CAPSULE | Refills: 0 | Status: SHIPPED | OUTPATIENT
Start: 2025-01-23 | End: 2025-01-30

## 2025-01-24 NOTE — PROGRESS NOTES
I called the pts sister back after speaking with the providers and changing his medication could possibly lead to C'diff and that's not something that we want. It would make his condition much worse. The sister states that if he starts running a temp or worsening she will call or if its bad enough she will take him to the ER.

## 2025-01-24 NOTE — PROGRESS NOTES
[facility]         Referring Provider: No referring provider defined for this encounter.    Subjective      Chief Complaint  Initial Evaluation (Acute UTI/ Ecoli)    History of Present Illness  Kamron Johns is a 64 y.o. male who presents today to [facility] for infectious disease evaluation and antibiotic management.    Patient presents as a referral from ED for positive blood cultures for E. Coli. Patient is accompanied by his sister. Patient denies fever, chills, body aches. Patient has a chronic cisse catheter due to neurogenic bladder. Patient is currently on Cefdinir and tolerating antibiotic therapy well. Urine culture results reviewed with susceptibility to Ceftriaxone, blood culture susceptibility remains in progress.       Past Medical History:   Diagnosis Date    Abnormal heart rhythm     Acid reflux     Anxiety     Bell's palsy     C. difficile colitis     Chronic idiopathic constipation     COPD (chronic obstructive pulmonary disease)     Depression     Diverticulitis     Hyperlipidemia     Hypertension     IBS (irritable bowel syndrome)     Kidney stones     Myocardial infarction     Pneumothorax     Ruptured disc, cervical     Stroke     Umbilical hernia        Past Surgical History:   Procedure Laterality Date    CARDIAC CATHETERIZATION      CARDIAC CATHETERIZATION N/A 2/17/2017    Procedure: Left Heart Cath;  Surgeon: Horacio Bedoya MD;  Location:  [facility];  Service:     ENDOSCOPY N/A 11/7/2016    Procedure: ESOPHAGOGASTRODUODENOSCOPY;  Surgeon: Henry Smyth DO;  Location: [facility] ENDOSCOPY;  Service:     FINGER SURGERY      NASAL SEPTUM SURGERY      UMBILICAL HERNIA REPAIR         Social History     Socioeconomic History    Marital status:    Tobacco Use    Smoking status: Every Day     Current packs/day: 3.50     Types: Cigarettes     Passive exposure: Current    Smokeless tobacco: Never   Vaping Use     Vaping status: Never Used   Substance and Sexual Activity    Alcohol use: No    Drug use: No    Sexual activity: Defer       Family History  family history includes Alcohol abuse in his father; Breast cancer in his sister; Cancer in his brother and father; Dementia in his mother; Emphysema in his mother; Heart attack in his father, maternal grandfather, paternal grandfather, and paternal uncle; Heart disease in his father; Hypertension in his brother.      There is no immunization history on file for this patient.     Allergies  Allergies   Allergen Reactions    Pseudoephedrine      Very hyper       The medication list has been reviewed and updated.   Current Medications    Current Outpatient Medications:     Belsomra 10 MG tablet, Take 10 mg by mouth Daily., Disp: , Rfl:     cefdinir (OMNICEF) 300 MG capsule, Take 1 capsule by mouth 2 (Two) Times a Day for 7 days., Disp: 14 capsule, Rfl: 0    clonazePAM (KlonoPIN) 1 MG tablet, Take 1 tablet by mouth 3 (Three) Times a Day As Needed for Seizures., Disp: , Rfl:     cyanocobalamin 1000 MCG/ML injection, Inject 1 mL into the appropriate muscle as directed by prescriber Every 14 (Fourteen) Days., Disp: , Rfl:     doxycycline (MONODOX) 100 MG capsule, Take 1 capsule by mouth 2 (Two) Times a Day., Disp: 20 capsule, Rfl: 0    DULoxetine (CYMBALTA) 30 MG capsule, TAKE ONE CAPSULE BY MOUTH EVERY NIGHT AT BEDTIME FOR 1 WEEK, THEN INCREASE TO TWO CAPSULES EVERY NIGHT AT BEDTIME FOR MOOD, Disp: , Rfl:     Eliquis 2.5 MG tablet tablet, TAKE ONE TABLET BY MOUTH TWO TIMES A DAY FOR CIRCULATION, Disp: , Rfl:     gabapentin (NEURONTIN) 300 MG capsule, TAKE THREE CAPSULES BY MOUTH THREE TIMES A DAY AS NEEDED FOR PERIPHERAL AND NEUROPATHIC PAIN, Disp: , Rfl:     HYDROcodone-acetaminophen (LORTAB)  MG per tablet, Take 1 tablet by mouth 4 (Four) Times a Day As Needed for Moderate Pain., Disp: , Rfl:     hydrOXYzine (VISTARIL) 50 MG capsule, Take 1 capsule by mouth 3 (Three)  "Times a Day As Needed for Anxiety for up to 20 doses., Disp: 20 capsule, Rfl: 0    metoprolol tartrate (LOPRESSOR) 25 MG tablet, Take 1 tablet by mouth Daily., Disp: 30 tablet, Rfl: 11    mupirocin (BACTROBAN) 2 % ointment, Apply 1 Application topically to the appropriate area as directed Daily., Disp: 30 g, Rfl: 0    nitroglycerin (NITROSTAT) 0.4 MG SL tablet, 1 under the tongue as needed for angina, may repeat q5mins for up three doses, Disp: 25 tablet, Rfl: 0    ondansetron (ZOFRAN) 8 MG tablet, Take 1 tablet by mouth Daily As Needed., Disp: , Rfl:     pantoprazole (PROTONIX) 40 MG EC tablet, Take 1 tablet by mouth As Needed., Disp: , Rfl:     polyethylene glycol (MIRALAX) packet, Take 17 g by mouth Daily., Disp: , Rfl:     rosuvastatin (CRESTOR) 10 MG tablet, Take 1 tablet by mouth Daily., Disp: 30 tablet, Rfl: 11    sodium chloride (OCEAN) 0.65 % nasal spray, Administer 1 spray into the nostril(s) as directed by provider As Needed for Congestion., Disp: , Rfl:     tamsulosin (FLOMAX) 0.4 MG capsule 24 hr capsule, TAKE ONE CAPSULE BY MOUTH EVERY DAY FOR PROSTATE, Disp: , Rfl:     cefdinir (OMNICEF) 300 MG capsule, Take 1 capsule by mouth 2 (Two) Times a Day for 7 days., Disp: 14 capsule, Rfl: 0      Review of Systems    Review of Systems   Constitutional: Negative.    HENT: Negative.     Eyes: Negative.    Respiratory: Negative.     Cardiovascular: Negative.    Gastrointestinal: Negative.    Endocrine: Negative.    Genitourinary:         Chronic cisse catheter.   Musculoskeletal:  Positive for gait problem.   Skin: Negative.    Allergic/Immunologic: Negative.    Hematological: Negative.    Psychiatric/Behavioral: Negative.          Objective     Vital Signs:  /59 (BP Location: Right arm, Patient Position: Sitting, Cuff Size: Small Adult)   Pulse 68   Ht 193 cm (76\")   Wt 72.6 kg (160 lb)   SpO2 95%   BMI 19.48 kg/m²   Estimated body mass index is 19.48 kg/m² as calculated from the following:    Height " "as of this encounter: 193 cm (76\").    Weight as of this encounter: 72.6 kg (160 lb).    Physical Exam  Constitutional:       General: He is not in acute distress.     Appearance: Normal appearance.   HENT:      Head: Normocephalic.      Nose: Nose normal.      Mouth/Throat:      Mouth: Mucous membranes are moist.   Eyes:      Pupils: Pupils are equal, round, and reactive to light.   Cardiovascular:      Rate and Rhythm: Normal rate and regular rhythm.      Pulses: Normal pulses.      Heart sounds: Normal heart sounds. No murmur heard.  Pulmonary:      Effort: Pulmonary effort is normal.      Breath sounds: Normal breath sounds.   Abdominal:      General: Bowel sounds are normal.      Palpations: Abdomen is soft.   Musculoskeletal:      Cervical back: Normal range of motion.   Skin:     General: Skin is warm and dry.   Neurological:      General: No focal deficit present.      Mental Status: He is alert and oriented to person, place, and time.   Psychiatric:         Mood and Affect: Mood normal.         Behavior: Behavior normal.         Thought Content: Thought content normal.          Result Review :  The following data was reviewed by FRANCISCO Ortiz     Lab Results  Lab Results   Component Value Date    WBC 6.95 01/23/2025    HGB 13.5 01/23/2025    HCT 42.2 01/23/2025    MCV 94.0 01/23/2025     01/23/2025     Lab Results   Component Value Date    GLUCOSE 114 (H) 01/20/2025    BUN 6 (L) 01/20/2025    CREATININE 0.73 (L) 01/20/2025    EGFRIFNONA 100 01/06/2019    BCR 8.2 01/20/2025    K 3.2 (L) 01/20/2025    CO2 29.4 (H) 01/20/2025    CALCIUM 8.8 01/20/2025    ALBUMIN 3.4 (L) 01/20/2025    AST 12 01/20/2025    ALT 6 01/20/2025      Lab Results   Component Value Date    CRP 16.78 (H) 01/23/2025        Blood Culture   Date Value Ref Range Status   01/20/2025 Escherichia coli (C)  Final   01/20/2025 Escherichia coli (C)  Final     BCID, PCR   Date Value Ref Range Status   01/20/2025 Escherichia coli. " "Identification by BCID2 PCR. (A) Negative by BCID PCR. Culture to Follow. Final     No results found for: \"CULTURES\", \"HSVCX\", \"URCX\"  No results found for: \"EYECULTURE\", \"GCCX\", \"HSVCULTURE\", \"LABHSV\"  No results found for: \"LEGIONELLA\", \"MRSACX\", \"MUMPSCX\", \"MYCOPLASCX\"  No results found for: \"NOCARDIACX\", \"STOOLCX\"  Urine Culture   Date Value Ref Range Status   01/20/2025 >100,000 CFU/mL Escherichia coli (A)  Final     No results found for: \"VIRALCULTU\", \"WOUNDCX\"    Radiology Results  MRI Lumbar Spine With & Without Contrast    Result Date: 1/21/2025  Impression:  Very slight chronic retrolisthesis of L2 on L3, L3 on L4, and L4 on L5. Degenerative disc disease at the L3-4 and L4-5 levels with associated disc desiccation weighted signal changes. No acute or chronic compression fracture deformity. No bone marrow edema. No discitis, abscess, or hematoma. No cord compression. No pathologic contrast enhancement. Small posterior lateral disc bulge components at the L2-3, L3-4, L4-5, and L5-S1 levels contribute to mild bilateral neural foraminal stenoses.   This report was finalized on 1/21/2025 1:22 AM by Vance Ortega MD.              Assessment / Plan        Diagnoses and all orders for this visit:    1. E coli bacteremia (Primary)  -     CBC & Differential; Future  -     C-reactive Protein; Future  -     Blood Culture - Blood,; Future  -     Blood Culture - Blood,; Future  -     CBC & Differential  -     C-reactive Protein  -     Blood Culture - Blood, Arm, Right  -     Blood Culture - Blood, Arm, Left    Other orders  -     cefdinir (OMNICEF) 300 MG capsule; Take 1 capsule by mouth 2 (Two) Times a Day for 7 days.  Dispense: 14 capsule; Refill: 0      CBC, CRP and Blood cultures x2 ordered for today. Patient to continue current 7 day course of cefdinir prescribed by ED provider. Additional 7 day course was sent to pharmacy in the setting of bacteremia. Patient to follow up in 1 week. Patient to be notified of any " concerning laboratory values.          Follow Up   Return in about 1 week (around 1/30/2025).    Visit Diagnoses:    ICD-10-CM ICD-9-CM   1. E coli bacteremia  R78.81 790.7    B96.20 041.49       Patient was given instructions and counseling regarding his condition or for health maintenance advice. Please see specific information pulled into the AVS if appropriate.     This document has been electronically signed by FRANCISCO Ortiz   January 23, 2025 21:59 EST      New Medications Ordered This Visit   Medications    cefdinir (OMNICEF) 300 MG capsule     Sig: Take 1 capsule by mouth 2 (Two) Times a Day for 7 days.     Dispense:  14 capsule     Refill:  0      Dictated Utilizing Dragon Dictation: Part of this note may be an electronic transcription/translation of spoken language to printed text using the Dragon Dictation System.

## 2025-01-27 NOTE — PROGRESS NOTES
Io called the pts sister back after speaking with a provider and she states that The blood cultures are not growing anything at 3 days, cbc was normal and crp improved slightly. Recommend to continue with the plan initiated by Gabrielle. The pts sister states she is going to skip next weeks appt bc its hard to get him out and if anything comes up she will call us to get him back on the schedule to see the provider.

## 2025-01-27 NOTE — PROGRESS NOTES
Pts sister called today about some cultures. I told her I would get with the provider and let her know something by the end of the day.

## 2025-01-28 LAB
BACTERIA SPEC AEROBE CULT: NORMAL
BACTERIA SPEC AEROBE CULT: NORMAL

## 2025-02-03 ENCOUNTER — TELEPHONE (OUTPATIENT)
Dept: CARDIOLOGY | Facility: CLINIC | Age: 65
End: 2025-02-03

## 2025-02-03 NOTE — TELEPHONE ENCOUNTER
“Please be informed that patient has passed. Patient has been marked  in the system. The date of death is: 2025    Caller: Alana Leiva    Relationship: Emergency Contact    Best call back number: [phone]    Did the patient have surgery within 30 days of their passing (Y/N): NO

## (undated) DEVICE — CANNULA,ADULT,SOFT-TOUCH,7'TUBE,UC: Brand: PENDING

## (undated) DEVICE — GUIDE CATHETER: Brand: MACH1™

## (undated) DEVICE — CATH DIAG EXPO .056 FL3.5 6F 100CM

## (undated) DEVICE — TREK CORONARY DILATATION CATHETER 3.0 MM X 15 MM / RAPID-EXCHANGE: Brand: TREK

## (undated) DEVICE — GW INQWIRE FC PTFE STD J/1.5 .035 260

## (undated) DEVICE — MODEL AT P54, P/N 700608-035KIT CONTENTS: HAND CONTROLLER, 3-WAY HIGH-PRESSURE STOPCOCK WITH ROTATING END AND PREMIUM HIGH-PRESSURE TUBING: Brand: ANGIOTOUCH® KIT

## (undated) DEVICE — PK CATH CARD 10

## (undated) DEVICE — TR BAND RADIAL ARTERY COMPRESSION DEVICE: Brand: TR BAND

## (undated) DEVICE — ST INF PRI SMRTSTE 20DRP 2VLV 24ML 117

## (undated) DEVICE — GW PRESS VERRATA STR 185CM

## (undated) DEVICE — DEV INFL MONARCH 25W

## (undated) DEVICE — KT VLV HEMO MAP ACC PLS LG/BORE MTL/INTRO W/TORQ/DEV

## (undated) DEVICE — A2000 MULTI-USE SYRINGE KIT, P/N 701277-003KIT CONTENTS: 100ML CONTRAST RESERVOIR AND TUBING WITH CONTRAST SPIKE AND CLAMP: Brand: A2000 MULTI-USE SYRINGE KIT

## (undated) DEVICE — GLIDESHEATH SLENDER STAINLESS STEEL KIT: Brand: GLIDESHEATH SLENDER

## (undated) DEVICE — CATH DIAG EXPO M/ PK 6FR FL4/FR4 PIG 3PK

## (undated) DEVICE — MODEL BT2000 P/N 700287-012KIT CONTENTS: MANIFOLD WITH SALINE AND CONTRAST PORTS, SALINE TUBING WITH SPIKE AND HAND SYRINGE, TRANSDUCER: Brand: BT2000 AUTOMATED MANIFOLD KIT